# Patient Record
Sex: FEMALE | Race: WHITE | Employment: OTHER | URBAN - METROPOLITAN AREA
[De-identification: names, ages, dates, MRNs, and addresses within clinical notes are randomized per-mention and may not be internally consistent; named-entity substitution may affect disease eponyms.]

---

## 2017-01-25 DIAGNOSIS — E03.9 HYPOTHYROIDISM, UNSPECIFIED TYPE: Primary | ICD-10-CM

## 2017-01-25 PROBLEM — D12.6 BENIGN NEOPLASM OF COLON: Status: ACTIVE | Noted: 2017-01-25

## 2017-01-25 RX ORDER — CALCIUM CARBONATE 500(1250)
TABLET ORAL
COMMUNITY
Start: 2006-11-26 | End: 2020-01-01

## 2017-01-25 RX ORDER — LEVOTHYROXINE SODIUM 112 UG/1
TABLET ORAL
COMMUNITY
Start: 2016-04-19 | End: 2017-03-27

## 2017-01-25 RX ORDER — MELATONIN
COMMUNITY
Start: 2014-01-30 | End: 2017-08-07

## 2017-01-25 RX ORDER — LISINOPRIL 20 MG/1
TABLET ORAL
COMMUNITY
Start: 2015-09-03 | End: 2017-02-23

## 2017-01-25 RX ORDER — ASPIRIN 325 MG
1 TABLET ORAL DAILY
COMMUNITY
Start: 2006-11-27

## 2017-01-25 RX ORDER — ROSUVASTATIN CALCIUM 5 MG/1
TABLET, COATED ORAL
COMMUNITY
Start: 2007-06-25 | End: 2017-02-23

## 2017-01-25 RX ORDER — TOLTERODINE TARTRATE 2 MG/1
TABLET, EXTENDED RELEASE ORAL
COMMUNITY
Start: 2006-11-26 | End: 2017-02-23

## 2017-01-25 RX ORDER — OMEGA-3/DHA/EPA/FISH OIL/COQ10 1900MG/2.5
EMULSION IN PACKET ORAL
COMMUNITY
Start: 2013-09-19 | End: 2017-08-07

## 2017-01-25 RX ORDER — METOPROLOL SUCCINATE 25 MG/1
TABLET, EXTENDED RELEASE ORAL
COMMUNITY
Start: 2014-01-30 | End: 2017-02-23

## 2017-01-26 RX ORDER — LEVOTHYROXINE SODIUM 112 UG/1
TABLET ORAL
Qty: 30 TABLET | Refills: 1 | Status: SHIPPED | OUTPATIENT
Start: 2017-01-26 | End: 2017-02-23

## 2017-01-26 RX ORDER — ROSUVASTATIN CALCIUM 5 MG/1
TABLET, COATED ORAL
Qty: 90 TABLET | Refills: 0 | Status: SHIPPED | OUTPATIENT
Start: 2017-01-26 | End: 2017-02-23

## 2017-01-26 NOTE — TELEPHONE ENCOUNTER
Made appt for patient to see Dr. Jose Smith next month. Will come to appt fasting for any bloodwork needed. Please sign and send script to [facility].  Alex Estrella, 01/26/2017, 1:30 PM

## 2017-01-26 NOTE — TELEPHONE ENCOUNTER
Pt  Needs tsh now. Please have pt come in for testing. Pt is due for follow up appointment. Please have pt  Schedule apptointment . Refills until appointmentt ok.       Denise Bland MD

## 2017-02-23 ENCOUNTER — OFFICE VISIT (OUTPATIENT)
Dept: FAMILY MEDICINE CLINIC | Facility: CLINIC | Age: 82
End: 2017-02-23

## 2017-02-23 ENCOUNTER — APPOINTMENT (OUTPATIENT)
Dept: LAB | Age: 82
End: 2017-02-23
Attending: FAMILY MEDICINE
Payer: MEDICARE

## 2017-02-23 VITALS
TEMPERATURE: 99 F | DIASTOLIC BLOOD PRESSURE: 60 MMHG | HEART RATE: 63 BPM | HEIGHT: 62.5 IN | BODY MASS INDEX: 29.54 KG/M2 | WEIGHT: 164.63 LBS | OXYGEN SATURATION: 97 % | SYSTOLIC BLOOD PRESSURE: 132 MMHG

## 2017-02-23 DIAGNOSIS — E78.49 FAMILIAL MULTIPLE LIPOPROTEIN-TYPE HYPERLIPIDEMIA: ICD-10-CM

## 2017-02-23 DIAGNOSIS — N18.2 CHRONIC KIDNEY DISEASE, STAGE II (MILD): ICD-10-CM

## 2017-02-23 DIAGNOSIS — R82.998 HYPERURICURIA: ICD-10-CM

## 2017-02-23 DIAGNOSIS — E03.9 HYPOTHYROIDISM, UNSPECIFIED TYPE: ICD-10-CM

## 2017-02-23 DIAGNOSIS — I10 ESSENTIAL HYPERTENSION: ICD-10-CM

## 2017-02-23 DIAGNOSIS — E78.49 FAMILIAL MULTIPLE LIPOPROTEIN-TYPE HYPERLIPIDEMIA: Primary | ICD-10-CM

## 2017-02-23 PROBLEM — M81.0 OSTEOPOROSIS: Status: ACTIVE | Noted: 2017-02-23

## 2017-02-23 LAB
ALBUMIN SERPL-MCNC: 3.5 G/DL (ref 3.5–4.8)
ALP LIVER SERPL-CCNC: 67 U/L (ref 55–142)
ALT SERPL-CCNC: 27 U/L (ref 14–54)
AST SERPL-CCNC: 20 U/L (ref 15–41)
BILIRUB SERPL-MCNC: 0.6 MG/DL (ref 0.1–2)
BUN BLD-MCNC: 21 MG/DL (ref 8–20)
CALCIUM BLD-MCNC: 9.1 MG/DL (ref 8.3–10.3)
CHLORIDE: 107 MMOL/L (ref 101–111)
CHOLEST SMN-MCNC: 137 MG/DL (ref ?–200)
CO2: 29 MMOL/L (ref 22–32)
CREAT BLD-MCNC: 0.99 MG/DL (ref 0.55–1.02)
GLUCOSE BLD-MCNC: 88 MG/DL (ref 70–99)
HDLC SERPL-MCNC: 64 MG/DL (ref 45–?)
HDLC SERPL: 2.14 {RATIO} (ref ?–4.44)
LDLC SERPL CALC-MCNC: 55 MG/DL (ref ?–130)
M PROTEIN MFR SERPL ELPH: 7.1 G/DL (ref 6.1–8.3)
NONHDLC SERPL-MCNC: 73 MG/DL (ref ?–130)
POTASSIUM SERPL-SCNC: 4.1 MMOL/L (ref 3.6–5.1)
SODIUM SERPL-SCNC: 143 MMOL/L (ref 136–144)
TRIGLYCERIDES: 91 MG/DL (ref ?–150)
TSI SER-ACNC: 2.39 MIU/ML (ref 0.35–5.5)
URIC ACID: 5 MG/DL (ref 2.4–8)
VLDL: 18 MG/DL (ref 5–40)

## 2017-02-23 PROCEDURE — 84550 ASSAY OF BLOOD/URIC ACID: CPT

## 2017-02-23 PROCEDURE — 80061 LIPID PANEL: CPT

## 2017-02-23 PROCEDURE — 84443 ASSAY THYROID STIM HORMONE: CPT

## 2017-02-23 PROCEDURE — 99215 OFFICE O/P EST HI 40 MIN: CPT | Performed by: FAMILY MEDICINE

## 2017-02-23 PROCEDURE — 80053 COMPREHEN METABOLIC PANEL: CPT

## 2017-02-23 RX ORDER — ROSUVASTATIN CALCIUM 5 MG/1
5 TABLET, COATED ORAL NIGHTLY
Qty: 30 TABLET | Refills: 6 | Status: SHIPPED | OUTPATIENT
Start: 2017-02-23 | End: 2017-05-30

## 2017-02-23 RX ORDER — METOPROLOL SUCCINATE 25 MG/1
25 TABLET, EXTENDED RELEASE ORAL DAILY
Qty: 30 TABLET | Refills: 6 | Status: SHIPPED | OUTPATIENT
Start: 2017-02-23 | End: 2017-03-27

## 2017-02-23 RX ORDER — LISINOPRIL 20 MG/1
20 TABLET ORAL DAILY
Qty: 30 TABLET | Refills: 6 | Status: SHIPPED | OUTPATIENT
Start: 2017-02-23 | End: 2017-08-07

## 2017-02-23 RX ORDER — TOLTERODINE TARTRATE 2 MG/1
2 TABLET, EXTENDED RELEASE ORAL DAILY
Qty: 30 TABLET | Refills: 6 | Status: SHIPPED | OUTPATIENT
Start: 2017-02-23 | End: 2017-08-07

## 2017-02-23 NOTE — PROGRESS NOTES
[facility] [place]  PROGRESS NOTE        HPI:   This is a 80year old female coming in for Patient presents with: Follow - Up: Thyroid f/u    Thyroid Problem  This is a chronic problem. The current episode started more than 1 year ago.  The prob Grandmother      Allergies:    No Known Allergies        Current Meds:    Current Outpatient Prescriptions:  DiltiaZEM HCl 30 MG Oral Tab Take 1 tablet (30 mg total) by mouth 2 (two) times daily.  Disp: 60 tablet Rfl: 6   lisinopril 20 MG Oral Tab Take 1 ta Negative for orthopnea. Leg swelling on left   Gastrointestinal: Negative. Endocrine: Negative. Genitourinary: Negative. Musculoskeletal: Negative. Negative for myalgias. Skin: Negative. Neurological: Negative.   Negative for headaches nightly. Essential hypertension  -     DiltiaZEM HCl 30 MG Oral Tab; Take 1 tablet (30 mg total) by mouth 2 (two) times daily. -     lisinopril 20 MG Oral Tab;  Take 1 tablet (20 mg total) by mouth daily.  -     Metoprolol Succinate ER (TOPROL XL) 25 MG

## 2017-02-24 ENCOUNTER — TELEPHONE (OUTPATIENT)
Dept: FAMILY MEDICINE CLINIC | Facility: CLINIC | Age: 82
End: 2017-02-24

## 2017-02-24 NOTE — TELEPHONE ENCOUNTER
----- Message from Librado Beckman MD sent at 2/24/2017  8:18 AM CST -----  Labs look very good.    Continue present managment

## 2017-02-24 NOTE — TELEPHONE ENCOUNTER
Patient notified of results and recommendations and expressed understanding.     Sofía Estrella, 02/24/2017, 10:20 AM

## 2017-03-27 DIAGNOSIS — I10 ESSENTIAL HYPERTENSION: Primary | ICD-10-CM

## 2017-03-27 RX ORDER — METOPROLOL SUCCINATE 25 MG/1
25 TABLET, EXTENDED RELEASE ORAL DAILY
Qty: 90 TABLET | Refills: 1 | Status: SHIPPED | OUTPATIENT
Start: 2017-03-27 | End: 2017-06-26

## 2017-03-27 RX ORDER — LEVOTHYROXINE SODIUM 112 UG/1
112 TABLET ORAL
Qty: 90 TABLET | Refills: 1 | Status: SHIPPED | OUTPATIENT
Start: 2017-03-27 | End: 2017-10-10

## 2017-03-27 NOTE — TELEPHONE ENCOUNTER
Last OV with Dr. Benjamin April 2/23/17  Last labs including TSH 2/23/17    Beverly Estrella, 03/27/2017, 12:04 PM

## 2017-05-30 DIAGNOSIS — E78.49 FAMILIAL MULTIPLE LIPOPROTEIN-TYPE HYPERLIPIDEMIA: Primary | ICD-10-CM

## 2017-05-30 NOTE — TELEPHONE ENCOUNTER
2/23/17 last lipid panel  2/23/17 last OV with Dr. Parish Fonseca    Your appointments     Date & Time Appointment Department [facility])    Thursday August 24, 2017  9:15 AM Follow up - Extended with Ryan Pratt MD [facility] 26, [facility]

## 2017-05-31 RX ORDER — ROSUVASTATIN CALCIUM 5 MG/1
5 TABLET, COATED ORAL NIGHTLY
Qty: 30 TABLET | Refills: 3 | Status: SHIPPED | OUTPATIENT
Start: 2017-05-31 | End: 2018-01-05

## 2017-06-26 DIAGNOSIS — I10 ESSENTIAL HYPERTENSION: ICD-10-CM

## 2017-06-26 RX ORDER — METOPROLOL SUCCINATE 25 MG/1
25 TABLET, EXTENDED RELEASE ORAL DAILY
Qty: 90 TABLET | Refills: 1 | Status: SHIPPED | OUTPATIENT
Start: 2017-06-26 | End: 2017-10-10

## 2017-07-10 ENCOUNTER — TELEPHONE (OUTPATIENT)
Dept: FAMILY MEDICINE CLINIC | Facility: CLINIC | Age: 82
End: 2017-07-10

## 2017-07-10 NOTE — TELEPHONE ENCOUNTER
Patient is now at [address] in [place]  She fell this morning while she was getting dressed  Her son states he found her but it was hours later  This is FYI for Dr. Kellie Estrella, 07/10/17, 5:32 PM

## 2017-07-21 ENCOUNTER — TELEPHONE (OUTPATIENT)
Dept: FAMILY MEDICINE CLINIC | Facility: CLINIC | Age: 82
End: 2017-07-21

## 2017-07-21 NOTE — TELEPHONE ENCOUNTER
Spoke to Etienne Izaguirre at [address]  Patient currently in the hospital at [facility] [address] in [place]  Was hospitalized due to fall - see phone note 7/10/17  Etienne Izaguirre states patient is being discharged on 7/26/17  Home care has been ordered for nursing visi

## 2017-07-21 NOTE — TELEPHONE ENCOUNTER
Would like to know if Dr. Patsy Byers will follow up with her home care.   She will be discharged from hospital 7/26/17

## 2017-08-07 ENCOUNTER — TELEPHONE (OUTPATIENT)
Dept: FAMILY MEDICINE CLINIC | Facility: CLINIC | Age: 82
End: 2017-08-07

## 2017-08-07 DIAGNOSIS — I10 ESSENTIAL HYPERTENSION: ICD-10-CM

## 2017-08-07 RX ORDER — LISINOPRIL 20 MG/1
20 TABLET ORAL 2 TIMES DAILY
Qty: 30 TABLET | Refills: 6 | COMMUNITY
Start: 2017-08-07 | End: 2017-10-17

## 2017-08-07 RX ORDER — DOXEPIN HYDROCHLORIDE 50 MG/1
1 CAPSULE ORAL DAILY
COMMUNITY
End: 2020-01-01

## 2017-08-07 RX ORDER — ACETAMINOPHEN 325 MG/1
2 TABLET ORAL EVERY 6 HOURS PRN
COMMUNITY

## 2017-08-07 NOTE — TELEPHONE ENCOUNTER
Fax from home health  Patient d/c from [place] [address] list updated    Hayden Estrella, 08/07/17, 2:13 PM

## 2017-08-08 ENCOUNTER — OFFICE VISIT (OUTPATIENT)
Dept: FAMILY MEDICINE CLINIC | Facility: CLINIC | Age: 82
End: 2017-08-08

## 2017-08-08 VITALS
HEART RATE: 64 BPM | OXYGEN SATURATION: 97 % | TEMPERATURE: 99 F | DIASTOLIC BLOOD PRESSURE: 78 MMHG | HEIGHT: 62.5 IN | SYSTOLIC BLOOD PRESSURE: 114 MMHG | BODY MASS INDEX: 28.74 KG/M2 | WEIGHT: 160.19 LBS | RESPIRATION RATE: 16 BRPM

## 2017-08-08 DIAGNOSIS — N39.0 URINARY TRACT INFECTION, SITE UNSPECIFIED: Primary | ICD-10-CM

## 2017-08-08 DIAGNOSIS — I48.0 PAROXYSMAL ATRIAL FIBRILLATION (HCC): ICD-10-CM

## 2017-08-08 LAB
ALBUMIN SERPL-MCNC: 3 G/DL (ref 3.5–4.8)
ALP LIVER SERPL-CCNC: 88 U/L (ref 55–142)
ALT SERPL-CCNC: 26 U/L (ref 14–54)
AST SERPL-CCNC: 19 U/L (ref 15–41)
BASOPHILS # BLD AUTO: 0.07 X10(3) UL (ref 0–0.1)
BASOPHILS NFR BLD AUTO: 0.8 %
BILIRUB SERPL-MCNC: 0.4 MG/DL (ref 0.1–2)
BUN BLD-MCNC: 16 MG/DL (ref 8–20)
CALCIUM BLD-MCNC: 9.1 MG/DL (ref 8.3–10.3)
CHLORIDE: 108 MMOL/L (ref 101–111)
CK: 90 IU/L (ref 26–192)
CO2: 25 MMOL/L (ref 22–32)
CREAT BLD-MCNC: 0.82 MG/DL (ref 0.55–1.02)
EOSINOPHIL # BLD AUTO: 0.21 X10(3) UL (ref 0–0.3)
EOSINOPHIL NFR BLD AUTO: 2.3 %
ERYTHROCYTE [DISTWIDTH] IN BLOOD BY AUTOMATED COUNT: 13.2 % (ref 11.5–16)
GLUCOSE BLD-MCNC: 111 MG/DL (ref 70–99)
HCT VFR BLD AUTO: 43.3 % (ref 34–50)
HGB BLD-MCNC: 14.2 G/DL (ref 12–16)
IMMATURE GRANULOCYTE COUNT: 0.02 X10(3) UL (ref 0–1)
IMMATURE GRANULOCYTE RATIO %: 0.2 %
LYMPHOCYTES # BLD AUTO: 2.04 X10(3) UL (ref 0.9–4)
LYMPHOCYTES NFR BLD AUTO: 22.5 %
M PROTEIN MFR SERPL ELPH: 6.7 G/DL (ref 6.1–8.3)
MCH RBC QN AUTO: 31.6 PG (ref 27–33.2)
MCHC RBC AUTO-ENTMCNC: 32.8 G/DL (ref 31–37)
MCV RBC AUTO: 96.4 FL (ref 81–100)
MONOCYTES # BLD AUTO: 0.82 X10(3) UL (ref 0.1–0.6)
MONOCYTES NFR BLD AUTO: 9.1 %
NEUTROPHIL ABS PRELIM: 5.89 X10 (3) UL (ref 1.3–6.7)
NEUTROPHILS # BLD AUTO: 5.89 X10(3) UL (ref 1.3–6.7)
NEUTROPHILS NFR BLD AUTO: 65.1 %
PLATELET # BLD AUTO: 230 10(3)UL (ref 150–450)
POTASSIUM SERPL-SCNC: 3.9 MMOL/L (ref 3.6–5.1)
RBC # BLD AUTO: 4.49 X10(6)UL (ref 3.8–5.1)
RED CELL DISTRIBUTION WIDTH-SD: 46.8 FL (ref 35.1–46.3)
SODIUM SERPL-SCNC: 142 MMOL/L (ref 136–144)
TSI SER-ACNC: 1.69 MIU/ML (ref 0.35–5.5)
WBC # BLD AUTO: 9.1 X10(3) UL (ref 4–13)

## 2017-08-08 PROCEDURE — 99214 OFFICE O/P EST MOD 30 MIN: CPT | Performed by: FAMILY MEDICINE

## 2017-08-08 PROCEDURE — 85025 COMPLETE CBC W/AUTO DIFF WBC: CPT | Performed by: FAMILY MEDICINE

## 2017-08-08 PROCEDURE — 80053 COMPREHEN METABOLIC PANEL: CPT | Performed by: FAMILY MEDICINE

## 2017-08-08 PROCEDURE — 84443 ASSAY THYROID STIM HORMONE: CPT | Performed by: FAMILY MEDICINE

## 2017-08-08 PROCEDURE — 82550 ASSAY OF CK (CPK): CPT | Performed by: FAMILY MEDICINE

## 2017-08-08 NOTE — PROGRESS NOTES
[facility] [place]  PROGRESS NOTE        HPI:   This is a 80year old female coming in for Patient presents with:  Hospital F/U: patient fell at home, inpatient at [address] [place] [address],[address] 7-10-17 discharged to rehab 7-15-17. Was on rehab floor for 11 days. Albumin 3.5 3.5 - 4.8 g/dL   Sodium 143 136 - 144 mmol/L   Potassium 4.1 3.6 - 5.1 mmol/L   Chloride 107 101 - 111 mmol/L   CO2 29.0 22.0 - 32.0 mmol/L   -LIPID PANEL   Result Value Ref Range   Cholesterol, Total 137 <200 mg/dL   Triglycerides 91 <150 mg aspirin 325 MG Oral Tab Take 1 tablet by mouth daily. Disp:  Rfl:    Calcium 500 MG Oral Tab  Disp:  Rfl:    acetaminophen 325 MG Oral Tab Take 2 tablets by mouth every 6 (six) hours as needed.  Disp:  Rfl:       Counseling given: Yes         Problem Lis Recall the named objects: BALL 1   Recall the named objects: CAR 1   Recall the named objects: MAN 1   SHOW wristwatch. ASK: What is this called? 1   SHOW pencil. ASK: What is this called? 1   SAY: I would like you to repeat this phrase after me:  \"No CULTURE REFLEX  -     CK CREATINE KINASE (NOT CREATININE)  -     CBC W/ DIFFERENTIAL    Paroxysmal atrial fibrillation (HCC)   -     TSH W REFLEX TO FREE T4; Future  -     TSH W REFLEX TO FREE T4      delirium   Will await recheck urine.   As patient was le

## 2017-08-09 ENCOUNTER — APPOINTMENT (OUTPATIENT)
Dept: LAB | Age: 82
End: 2017-08-09
Attending: FAMILY MEDICINE
Payer: MEDICARE

## 2017-08-10 ENCOUNTER — TELEPHONE (OUTPATIENT)
Dept: FAMILY MEDICINE CLINIC | Facility: CLINIC | Age: 82
End: 2017-08-10

## 2017-08-10 RX ORDER — NITROFURANTOIN 25; 75 MG/1; MG/1
100 CAPSULE ORAL 2 TIMES DAILY
Qty: 20 CAPSULE | Refills: 0 | Status: SHIPPED | OUTPATIENT
Start: 2017-08-10 | End: 2017-08-20

## 2017-08-10 NOTE — TELEPHONE ENCOUNTER
Lorenzo Pruitt from [facility] returned call. Please call her @ [phone].

## 2017-08-10 NOTE — TELEPHONE ENCOUNTER
Patient's son Jermain Guerra notified of results and recommendations and expressed understanding.   Script to [employer] per son's request    Your appointments     Date & Time Appointment Department [facility])    Aug 24, 2017  9:15 AM CDT Follow up - Extended wi

## 2017-08-10 NOTE — TELEPHONE ENCOUNTER
----- Message from Aspen Nunez MD sent at 8/10/2017  7:07 AM CDT -----  Patient needs more protein in her diet,  But labs otherwise look good. Still awaiting urine.

## 2017-08-10 NOTE — TELEPHONE ENCOUNTER
----- Message from Ion Serrano MD sent at 8/10/2017  7:10 AM CDT -----  Still awaiting culture, however,  She has a very contaminated urine. macrobid 100 mg po bid x 10 days and then recommend pelvic exam and cath ua.  ( please make sure we have the sup

## 2017-08-11 ENCOUNTER — TELEPHONE (OUTPATIENT)
Dept: FAMILY MEDICINE CLINIC | Facility: CLINIC | Age: 82
End: 2017-08-11

## 2017-08-11 NOTE — TELEPHONE ENCOUNTER
Sensitivities included on report with final culture results  Patient was started on macrobid yesterday - see phone note  Spoke to son about this    Real Estrella, 08/11/17, 10:43 AM

## 2017-08-11 NOTE — TELEPHONE ENCOUNTER
Called to patient's son Padilla Richard  No answer and no voicemail  Will try again later    Nishant Estrella, 08/11/17, 4:36 PM

## 2017-08-11 NOTE — TELEPHONE ENCOUNTER
----- Message from Ramesh Vizcaino MD sent at 8/10/2017  8:31 PM CDT -----  Will lab do sensitives? Recommend to start medication as previously. Has patient started medications?

## 2017-08-12 RX ORDER — CIPROFLOXACIN 500 MG/1
500 TABLET, FILM COATED ORAL 2 TIMES DAILY
Qty: 14 TABLET | Refills: 0 | Status: SHIPPED | OUTPATIENT
Start: 2017-08-12 | End: 2017-08-19

## 2017-08-12 NOTE — TELEPHONE ENCOUNTER
Patient's son Dionte Adams notified of results and recommendations and expressed understanding.   Cipro script to Tang Estrella, 08/12/17, 11:32 AM

## 2017-08-24 ENCOUNTER — TELEPHONE (OUTPATIENT)
Dept: FAMILY MEDICINE CLINIC | Facility: CLINIC | Age: 82
End: 2017-08-24

## 2017-08-24 PROCEDURE — 87086 URINE CULTURE/COLONY COUNT: CPT | Performed by: FAMILY MEDICINE

## 2017-08-24 PROCEDURE — 81003 URINALYSIS AUTO W/O SCOPE: CPT | Performed by: FAMILY MEDICINE

## 2017-08-24 NOTE — TELEPHONE ENCOUNTER
----- Message from Chaitanya Reed MD sent at 8/24/2017  4:25 PM CDT -----  Very normal.    THIS IS URINALYSIS RESULTS    Chuyita Estrella, 08/25/17, 4:15 PM

## 2017-08-24 NOTE — TELEPHONE ENCOUNTER
Tried to call son Zulema Luis on his cell phone number  Rang 2-3 times then busy signal  Will try again tomorrow    Edgemont Peek Minor, 08/24/17, 5:22 PM

## 2017-08-25 NOTE — TELEPHONE ENCOUNTER
----- Message from Ion Serrano MD sent at 8/25/2017  4:00 PM CDT -----  normal results, please notify patient.     THIS IS URINE CULTURE RESULTS    Julissa Estrella, 08/25/17, 4:15 PM

## 2017-08-25 NOTE — TELEPHONE ENCOUNTER
Tried to call son Gaurang's cell phone again  Unable to get through on that number  Called to home number in chart  Patient answered the phone  Patient was confused on the phone  Unable to give me an alternate number or tell me when Vinnie Barrett would be home (deedee

## 2017-08-25 NOTE — PROGRESS NOTES
[facility] [place]  PROGRESS NOTE        HPI:   This is a 80year old female coming in for Patient presents with: Follow - Up: Follow up from previous UTI    HPI  Patient is here for a follow up of her urinary tract infection.    Her memory cont Allergies:    No Known Allergies        Current Meds:    Current Outpatient Prescriptions:  Omega-3 Fatty Acids (OMEGA-3 1450 OR) Take by mouth. Disp:  Rfl:    multivitamin Oral Tab Take 1 tablet by mouth daily.  Disp:  Rfl:    lisinopril 20 MG Oral Tab Genitourinary: Positive for difficulty urinating and urgency. Incontinence   Neurological: Positive for weakness.        EXAM:   /76 (BP Location: Left arm, Patient Position: Sitting, Cuff Size: large)   Pulse 72   Temp 98.2 °F (36.8 °C) (Tem type  -     NEURO - EXTERNAL  -     URINALYSIS, ROUTINE; Future  -     URINE CULTURE, ROUTINE; Future  -     URINALYSIS, ROUTINE  -     URINE CULTURE, ROUTINE    Urinary tract infection without hematuria, site unspecified  -     URINALYSIS, ROUTINE; Future

## 2017-08-26 NOTE — TELEPHONE ENCOUNTER
Have not been able to get son Juan Chavez on his cell phone  Unable to leave message with patient due to dementia  Letter mailed to patient's son    Julissa Sanches Minor, 08/26/17, 11:50 AM

## 2017-08-28 ENCOUNTER — TELEPHONE (OUTPATIENT)
Dept: FAMILY MEDICINE CLINIC | Facility: CLINIC | Age: 82
End: 2017-08-28

## 2017-08-28 NOTE — TELEPHONE ENCOUNTER
8/24/17 - urine was done via straight cath and sent to lab  See results notes - urine was normal  Discussed with patient's son Padilla Gascaimelda  See phone note 8/24/17 - unable to reach son  Apologized to alejandrina Richard - we had wrong cell phone number in chart    Alejandrina Richard exp

## 2017-08-31 ENCOUNTER — TELEPHONE (OUTPATIENT)
Dept: FAMILY MEDICINE CLINIC | Facility: CLINIC | Age: 82
End: 2017-08-31

## 2017-10-05 ENCOUNTER — OFFICE VISIT (OUTPATIENT)
Dept: FAMILY MEDICINE CLINIC | Facility: CLINIC | Age: 82
End: 2017-10-05

## 2017-10-05 VITALS
RESPIRATION RATE: 20 BRPM | DIASTOLIC BLOOD PRESSURE: 72 MMHG | TEMPERATURE: 99 F | HEART RATE: 64 BPM | HEIGHT: 62.5 IN | SYSTOLIC BLOOD PRESSURE: 134 MMHG | WEIGHT: 162.19 LBS | BODY MASS INDEX: 29.1 KG/M2

## 2017-10-05 DIAGNOSIS — F02.80 LATE ONSET ALZHEIMER'S DISEASE WITHOUT BEHAVIORAL DISTURBANCE (HCC): ICD-10-CM

## 2017-10-05 DIAGNOSIS — E03.9 HYPOTHYROIDISM, UNSPECIFIED TYPE: ICD-10-CM

## 2017-10-05 DIAGNOSIS — N39.0 URINARY TRACT INFECTION WITHOUT HEMATURIA, SITE UNSPECIFIED: ICD-10-CM

## 2017-10-05 DIAGNOSIS — G30.1 LATE ONSET ALZHEIMER'S DISEASE WITHOUT BEHAVIORAL DISTURBANCE (HCC): ICD-10-CM

## 2017-10-05 DIAGNOSIS — I10 ESSENTIAL HYPERTENSION: Primary | ICD-10-CM

## 2017-10-05 DIAGNOSIS — N39.41 URGE INCONTINENCE: ICD-10-CM

## 2017-10-05 DIAGNOSIS — N18.2 CHRONIC KIDNEY DISEASE, STAGE II (MILD): ICD-10-CM

## 2017-10-05 PROCEDURE — G0008 ADMIN INFLUENZA VIRUS VAC: HCPCS | Performed by: FAMILY MEDICINE

## 2017-10-05 PROCEDURE — 99214 OFFICE O/P EST MOD 30 MIN: CPT | Performed by: FAMILY MEDICINE

## 2017-10-05 PROCEDURE — 90653 IIV ADJUVANT VACCINE IM: CPT | Performed by: FAMILY MEDICINE

## 2017-10-05 NOTE — PROGRESS NOTES
[facility] [place]  PROGRESS NOTE        HPI:   This is a 80year old female coming in for Patient presents with:   Follow - Up: UTI follow up    HPI patient has overall been doing fairly well her delirium seems to be slightly better since there Son, crystal Combs  to make decisions in the event she is unable [phone]      Smoking status: Never Smoker                                                              Smokeless tobacco: Never Used                      Alcohol use: Yes           0. Nontoxic uninodular goiter     Hx of tonsillectomy     Acquired trigger finger     Urge incontinence     History of hemiarthroplasty of left hip     Osteoporosis     Late onset Alzheimer's disease without behavioral disturbance      REVIEW OF SYSTEMS:   Re time.  Hypothyroidism, unspecified type  Well-controlled continue present management. Chronic kidney disease, stage II (mild)  Plan follow-up UA.   Late onset Alzheimer's disease without behavioral disturbance  Discussed with son will fill out paperwork fo

## 2017-10-07 PROCEDURE — 87086 URINE CULTURE/COLONY COUNT: CPT | Performed by: FAMILY MEDICINE

## 2017-10-07 PROCEDURE — 81001 URINALYSIS AUTO W/SCOPE: CPT | Performed by: FAMILY MEDICINE

## 2017-10-10 DIAGNOSIS — I10 ESSENTIAL HYPERTENSION: ICD-10-CM

## 2017-10-10 RX ORDER — LEVOTHYROXINE SODIUM 112 UG/1
112 TABLET ORAL
Qty: 90 TABLET | Refills: 1 | Status: SHIPPED | OUTPATIENT
Start: 2017-10-10 | End: 2018-05-12

## 2017-10-10 RX ORDER — METOPROLOL SUCCINATE 25 MG/1
25 TABLET, EXTENDED RELEASE ORAL DAILY
Qty: 90 TABLET | Refills: 1 | Status: SHIPPED | OUTPATIENT
Start: 2017-10-10 | End: 2018-06-25

## 2017-10-10 NOTE — TELEPHONE ENCOUNTER
Future appt:     Your appointments     Date & Time Appointment Department [facility])    Jan 04, 2018  3:00 PM CST Follow up - Extended with Lali Meza MD [address] [place] [address], [facility]

## 2017-10-17 DIAGNOSIS — I10 ESSENTIAL HYPERTENSION: ICD-10-CM

## 2017-10-17 NOTE — TELEPHONE ENCOUNTER
Future appt:     Your appointments     Date & Time Appointment Department [facility])    Jan 04, 2018  3:00 PM CST Follow up - Extended with Brittany Wang MD [address] [place] [address] Gr

## 2017-10-19 DIAGNOSIS — I10 ESSENTIAL HYPERTENSION: ICD-10-CM

## 2017-10-19 RX ORDER — LISINOPRIL 20 MG/1
TABLET ORAL
Qty: 90 TABLET | Refills: 1 | Status: SHIPPED | OUTPATIENT
Start: 2017-10-19 | End: 2017-10-20

## 2017-10-20 ENCOUNTER — TELEPHONE (OUTPATIENT)
Dept: FAMILY MEDICINE CLINIC | Facility: CLINIC | Age: 82
End: 2017-10-20

## 2017-10-20 DIAGNOSIS — I10 ESSENTIAL HYPERTENSION: ICD-10-CM

## 2017-10-20 RX ORDER — LISINOPRIL 20 MG/1
TABLET ORAL
Qty: 90 TABLET | Refills: 1 | OUTPATIENT
Start: 2017-10-20

## 2017-10-20 RX ORDER — LISINOPRIL 20 MG/1
20 TABLET ORAL 2 TIMES DAILY
Qty: 180 TABLET | Refills: 1 | Status: SHIPPED | OUTPATIENT
Start: 2017-10-20 | End: 2018-04-09

## 2017-10-20 NOTE — TELEPHONE ENCOUNTER
Patient's son states that when patient was hospitalized her lisinopril was changed to twice a day instead of once a day  Needs new script sent to [facility]   Patient out of gm Estrella, 10/20/17, 2:15 PM

## 2018-01-04 NOTE — PROGRESS NOTES
[address]  PROGRESS NOTE  Charisma Ribeiro is a 80year old female. Chief Complaint:  Patient presents with: Follow - Up  Swelling: cannot get wedding ring off finger    HPI:   Patient presents to office visit for follow-up.       Lyndon FLYNN mouth before breakfast. Disp: 90 tablet Rfl: 1   Metoprolol Succinate ER (TOPROL XL) 25 MG Oral Tablet 24 Hr Take 1 tablet (25 mg total) by mouth daily. Disp: 90 tablet Rfl: 1   Omega-3 Fatty Acids (OMEGA-3 1450 OR) Take by mouth.  Disp:  Rfl:    acetaminop diagnosis)  Hypothyroidism, unspecified type  Finger swelling      PLAN: Meds as below.   Comfort care instructions as listed in Patient Instructions    Meds & Refills for this Visit:    Signed Prescriptions Disp Refills    Rosuvastatin Calcium (CRESTOR) 5

## 2018-01-05 ENCOUNTER — OFFICE VISIT (OUTPATIENT)
Dept: FAMILY MEDICINE CLINIC | Facility: CLINIC | Age: 83
End: 2018-01-05

## 2018-01-05 VITALS
HEIGHT: 62.5 IN | TEMPERATURE: 99 F | HEART RATE: 72 BPM | BODY MASS INDEX: 29.93 KG/M2 | RESPIRATION RATE: 20 BRPM | SYSTOLIC BLOOD PRESSURE: 132 MMHG | WEIGHT: 166.81 LBS | DIASTOLIC BLOOD PRESSURE: 80 MMHG

## 2018-01-05 DIAGNOSIS — I10 ESSENTIAL HYPERTENSION: Primary | ICD-10-CM

## 2018-01-05 DIAGNOSIS — N18.2 CHRONIC KIDNEY DISEASE, STAGE II (MILD): ICD-10-CM

## 2018-01-05 DIAGNOSIS — M79.89 FINGER SWELLING: ICD-10-CM

## 2018-01-05 DIAGNOSIS — E78.49 FAMILIAL MULTIPLE LIPOPROTEIN-TYPE HYPERLIPIDEMIA: ICD-10-CM

## 2018-01-05 DIAGNOSIS — E03.9 HYPOTHYROIDISM, UNSPECIFIED TYPE: ICD-10-CM

## 2018-01-05 PROBLEM — M19.90 ARTHRITIS: Status: ACTIVE | Noted: 2018-01-05

## 2018-01-05 PROBLEM — E78.5 HYPERLIPIDEMIA: Status: ACTIVE | Noted: 2018-01-05

## 2018-01-05 PROBLEM — I48.91 ATRIAL FIBRILLATION (HCC): Status: ACTIVE | Noted: 2018-01-05

## 2018-01-05 LAB
ALBUMIN SERPL-MCNC: 3.3 G/DL (ref 3.5–4.8)
ALP LIVER SERPL-CCNC: 73 U/L (ref 55–142)
ALT SERPL-CCNC: 28 U/L (ref 14–54)
AST SERPL-CCNC: 26 U/L (ref 15–41)
BASOPHILS # BLD AUTO: 0.06 X10(3) UL (ref 0–0.1)
BASOPHILS NFR BLD AUTO: 0.7 %
BILIRUB SERPL-MCNC: 0.3 MG/DL (ref 0.1–2)
BUN BLD-MCNC: 25 MG/DL (ref 8–20)
CALCIUM BLD-MCNC: 9.6 MG/DL (ref 8.3–10.3)
CHLORIDE: 107 MMOL/L (ref 101–111)
CO2: 27 MMOL/L (ref 22–32)
CREAT BLD-MCNC: 0.88 MG/DL (ref 0.55–1.02)
EOSINOPHIL # BLD AUTO: 0.15 X10(3) UL (ref 0–0.3)
EOSINOPHIL NFR BLD AUTO: 1.7 %
ERYTHROCYTE [DISTWIDTH] IN BLOOD BY AUTOMATED COUNT: 12.9 % (ref 11.5–16)
GLUCOSE BLD-MCNC: 94 MG/DL (ref 70–99)
HCT VFR BLD AUTO: 49.3 % (ref 34–50)
HGB BLD-MCNC: 16.1 G/DL (ref 12–16)
IMMATURE GRANULOCYTE COUNT: 0.01 X10(3) UL (ref 0–1)
IMMATURE GRANULOCYTE RATIO %: 0.1 %
LYMPHOCYTES # BLD AUTO: 2.6 X10(3) UL (ref 0.9–4)
LYMPHOCYTES NFR BLD AUTO: 29.1 %
M PROTEIN MFR SERPL ELPH: 7.2 G/DL (ref 6.1–8.3)
MCH RBC QN AUTO: 31.3 PG (ref 27–33.2)
MCHC RBC AUTO-ENTMCNC: 32.7 G/DL (ref 31–37)
MCV RBC AUTO: 95.9 FL (ref 81–100)
MONOCYTES # BLD AUTO: 0.76 X10(3) UL (ref 0.1–0.6)
MONOCYTES NFR BLD AUTO: 8.5 %
NEUTROPHIL ABS PRELIM: 5.36 X10 (3) UL (ref 1.3–6.7)
NEUTROPHILS # BLD AUTO: 5.36 X10(3) UL (ref 1.3–6.7)
NEUTROPHILS NFR BLD AUTO: 59.9 %
PLATELET # BLD AUTO: 267 10(3)UL (ref 150–450)
POTASSIUM SERPL-SCNC: 4.5 MMOL/L (ref 3.6–5.1)
RBC # BLD AUTO: 5.14 X10(6)UL (ref 3.8–5.1)
RED CELL DISTRIBUTION WIDTH-SD: 45.8 FL (ref 35.1–46.3)
SODIUM SERPL-SCNC: 141 MMOL/L (ref 136–144)
WBC # BLD AUTO: 8.9 X10(3) UL (ref 4–13)

## 2018-01-05 PROCEDURE — 85025 COMPLETE CBC W/AUTO DIFF WBC: CPT | Performed by: NURSE PRACTITIONER

## 2018-01-05 PROCEDURE — 80053 COMPREHEN METABOLIC PANEL: CPT | Performed by: NURSE PRACTITIONER

## 2018-01-05 PROCEDURE — 99214 OFFICE O/P EST MOD 30 MIN: CPT | Performed by: NURSE PRACTITIONER

## 2018-01-05 RX ORDER — ROSUVASTATIN CALCIUM 5 MG/1
5 TABLET, COATED ORAL NIGHTLY
Qty: 30 TABLET | Refills: 3 | Status: SHIPPED | OUTPATIENT
Start: 2018-01-05 | End: 2018-05-12

## 2018-01-05 NOTE — PATIENT INSTRUCTIONS
Blood work today  Refilled crestor  Recommend ice water and/or soapy water to help get ring off. If unable to ring off or have increase pain or inability to move/bend finger or finger turns blue--go to ER.   Return in 3 months for physical with Dr. Jake Mendoza and

## 2018-01-09 ENCOUNTER — TELEPHONE (OUTPATIENT)
Dept: FAMILY MEDICINE CLINIC | Facility: CLINIC | Age: 83
End: 2018-01-09

## 2018-04-09 DIAGNOSIS — I10 ESSENTIAL HYPERTENSION: ICD-10-CM

## 2018-04-10 RX ORDER — LISINOPRIL 20 MG/1
TABLET ORAL
Qty: 180 TABLET | Refills: 0 | Status: SHIPPED | OUTPATIENT
Start: 2018-04-10 | End: 2018-06-25

## 2018-04-10 RX ORDER — METOPROLOL SUCCINATE 25 MG/1
TABLET, EXTENDED RELEASE ORAL
Qty: 90 TABLET | Refills: 0 | Status: SHIPPED | OUTPATIENT
Start: 2018-04-10 | End: 2018-12-18 | Stop reason: ALTCHOICE

## 2018-04-10 NOTE — TELEPHONE ENCOUNTER
Future appt:     Your appointments     Date & Time Appointment Department [facility])    Jun 25, 2018  2:20 PM CDT Medicare Annual Well Visit with Isabell Orona MD [facility], [place] ([place]

## 2018-05-12 DIAGNOSIS — E78.49 FAMILIAL MULTIPLE LIPOPROTEIN-TYPE HYPERLIPIDEMIA: ICD-10-CM

## 2018-05-12 NOTE — TELEPHONE ENCOUNTER
Future appt:     Your appointments     Date & Time Appointment Department [facility])    Jun 25, 2018  2:20 PM CDT Medicare Annual Well Visit with Ryan Pratt MD [address] [place] [address], [place] ([facility])        Charo Todd

## 2018-05-14 RX ORDER — ROSUVASTATIN CALCIUM 5 MG/1
5 TABLET, COATED ORAL NIGHTLY
Qty: 90 TABLET | Refills: 0 | Status: SHIPPED | OUTPATIENT
Start: 2018-05-14 | End: 2018-06-25

## 2018-05-14 RX ORDER — LEVOTHYROXINE SODIUM 112 UG/1
TABLET ORAL
Qty: 90 TABLET | Refills: 1 | OUTPATIENT
Start: 2018-05-14

## 2018-05-14 RX ORDER — LEVOTHYROXINE SODIUM 112 UG/1
112 TABLET ORAL
Qty: 90 TABLET | Refills: 0 | Status: SHIPPED | OUTPATIENT
Start: 2018-05-14 | End: 2018-06-25

## 2018-05-14 NOTE — TELEPHONE ENCOUNTER
Dr. Cassidy Mandujano is out of the office today. Refills done.  DARREL Nichole, FNP-BC  5/14/2018  11:11 AM

## 2018-05-14 NOTE — TELEPHONE ENCOUNTER
Future appt:     Your appointments     Date & Time Appointment Department [facility])    Jun 25, 2018  2:20 PM CDT Medicare Annual Well Visit with Beckie Ortega MD [address] [place] [address], [place] ([facility]        Danny Ying

## 2018-06-25 ENCOUNTER — OFFICE VISIT (OUTPATIENT)
Dept: FAMILY MEDICINE CLINIC | Facility: CLINIC | Age: 83
End: 2018-06-25

## 2018-06-25 VITALS
RESPIRATION RATE: 18 BRPM | HEIGHT: 62.5 IN | SYSTOLIC BLOOD PRESSURE: 122 MMHG | DIASTOLIC BLOOD PRESSURE: 80 MMHG | WEIGHT: 174.63 LBS | TEMPERATURE: 97 F | BODY MASS INDEX: 31.33 KG/M2 | HEART RATE: 72 BPM

## 2018-06-25 DIAGNOSIS — E03.9 HYPOTHYROIDISM, UNSPECIFIED TYPE: ICD-10-CM

## 2018-06-25 DIAGNOSIS — M81.0 AGE-RELATED OSTEOPOROSIS WITHOUT CURRENT PATHOLOGICAL FRACTURE: ICD-10-CM

## 2018-06-25 DIAGNOSIS — Z13.31 DEPRESSION SCREENING: ICD-10-CM

## 2018-06-25 DIAGNOSIS — E78.49 FAMILIAL MULTIPLE LIPOPROTEIN-TYPE HYPERLIPIDEMIA: ICD-10-CM

## 2018-06-25 DIAGNOSIS — N18.2 CHRONIC KIDNEY DISEASE, STAGE II (MILD): ICD-10-CM

## 2018-06-25 DIAGNOSIS — Z00.00 ENCOUNTER FOR ANNUAL HEALTH EXAMINATION: ICD-10-CM

## 2018-06-25 DIAGNOSIS — R82.998 HYPERURICURIA: ICD-10-CM

## 2018-06-25 DIAGNOSIS — E78.01 FAMILIAL HYPERCHOLESTEROLEMIA: ICD-10-CM

## 2018-06-25 DIAGNOSIS — I48.20 CHRONIC ATRIAL FIBRILLATION (HCC): Primary | ICD-10-CM

## 2018-06-25 DIAGNOSIS — I10 ESSENTIAL HYPERTENSION: ICD-10-CM

## 2018-06-25 DIAGNOSIS — E83.51 HYPOCALCEMIA: ICD-10-CM

## 2018-06-25 PROCEDURE — G0442 ANNUAL ALCOHOL SCREEN 15 MIN: HCPCS | Performed by: FAMILY MEDICINE

## 2018-06-25 PROCEDURE — G0439 PPPS, SUBSEQ VISIT: HCPCS | Performed by: FAMILY MEDICINE

## 2018-06-25 PROCEDURE — G0444 DEPRESSION SCREEN ANNUAL: HCPCS | Performed by: FAMILY MEDICINE

## 2018-06-25 PROCEDURE — 90715 TDAP VACCINE 7 YRS/> IM: CPT | Performed by: FAMILY MEDICINE

## 2018-06-25 PROCEDURE — 90471 IMMUNIZATION ADMIN: CPT | Performed by: FAMILY MEDICINE

## 2018-06-25 PROCEDURE — 99497 ADVNCD CARE PLAN 30 MIN: CPT | Performed by: FAMILY MEDICINE

## 2018-06-25 RX ORDER — METOPROLOL SUCCINATE 25 MG/1
25 TABLET, EXTENDED RELEASE ORAL DAILY
Qty: 90 TABLET | Refills: 1 | Status: SHIPPED | OUTPATIENT
Start: 2018-06-25 | End: 2019-01-15

## 2018-06-25 RX ORDER — ROSUVASTATIN CALCIUM 5 MG/1
5 TABLET, COATED ORAL NIGHTLY
Qty: 90 TABLET | Refills: 0 | Status: SHIPPED | OUTPATIENT
Start: 2018-06-25 | End: 2019-07-11

## 2018-06-25 RX ORDER — LISINOPRIL 20 MG/1
20 TABLET ORAL 2 TIMES DAILY
Qty: 180 TABLET | Refills: 1 | Status: SHIPPED | OUTPATIENT
Start: 2018-06-25 | End: 2018-10-15

## 2018-06-25 RX ORDER — LEVOTHYROXINE SODIUM 112 UG/1
112 TABLET ORAL
Qty: 90 TABLET | Refills: 0 | Status: SHIPPED | OUTPATIENT
Start: 2018-06-25 | End: 2018-07-06

## 2018-06-25 NOTE — PROGRESS NOTES
HPI:   Eleanor Carbajal is a 80year old female who presents for a Medicare Subsequent Annual Wellness visit (Pt already had Initial Annual Wellness). Patient has a spot on her back noted by her son.    Noticed that she has  A few brown spots on her ba help    She has Toileting difficulties based on screening of functional status. Toileting: Cannot do without help    She has Driving difficulties based on screening of functional status.    Driving: Cannot do without help   She has Meal Preparation diffic low risk.     Patient Care Team: Patient Care Team:  Mica Winchester MD as PCP - [facility])    Patient Active Problem List:     Hyperuricuria     Pain in thoracic spine     Cataract     Chronic kidney disease, stage II (mild)     Benign neopla Levothyroxine Sodium (SYNTHROID) 112 MCG Oral Tab Take 1 tablet (112 mcg total) by mouth before breakfast.   METOPROLOL SUCCINATE ER 25 MG Oral Tablet 24 Hr TAKE 1 TABLET BY MOUTH DAILY.    Omega-3 Fatty Acids (OMEGA-3 1450 OR) Take by mouth.   multivitam encounter: 174 lb 9.6 oz.     Medicare Hearing Assessment  (Required for AWV/SWV)    Hearing Screening    Time taken:  6/25/2018  2:22 PM  Screening Method:  Finger Rub  Finger Rub Result:  Pass             Visual Acuity                           Physical E nightly. Discussed stopping medications. No inclination at this time. Discussed paucity of literature.      Other orders  -     TETANUS, DIPHTHERIA TOXOIDS AND ACELLULAR PERTUSIS VACCINE (TDAP), >7 YEARS, IM USE  -     Levothyroxine Sodium (Genemarjan Smith any previous visit. No flowsheet data found. Fecal Occult Blood Annually No results found for: FOB No flowsheet data found. Glaucoma Screening      Ophthalmology Visit Annually: Diabetics, FHx Glaucoma, AA>50, > 65 No flowsheet data found. SPECIFIC DISEASE MONITORING Internal Lab or Procedure External Lab or Procedure      Annual Monitoring of Persistent     Medications (ACE/ARB, digoxin diuretics, anticonvulsants.)    Potassium  Annually Potassium (mmol/L)   Date Value   01/05/2018 4.5

## 2018-06-25 NOTE — PATIENT INSTRUCTIONS
Estefani Sung's SCREENING SCHEDULE   Tests on this list are recommended by your physician but may not be covered, or covered at this frequency, by your insurer. Please check with your insurance carrier before scheduling to verify coverage.    PREVENTATIV if abnormal There are no preventive care reminders to display for this patient. Update Health Maintenance if applicable    Flex Sigmoidoscopy Screen  Covered every 5 years No results found for this or any previous visit. No flowsheet data found.      Fecal birthday    Pneumococcal 23 (Pneumovax)  Covered Once after 65 No orders found for this or any previous visit. Please get once after your 65th birthday    Hepatitis B for Moderate/High Risk       No orders found for this or any previous visit.  Medium/high

## 2018-07-03 ENCOUNTER — LABORATORY ENCOUNTER (OUTPATIENT)
Dept: LAB | Age: 83
End: 2018-07-03
Attending: FAMILY MEDICINE
Payer: MEDICARE

## 2018-07-03 DIAGNOSIS — I10 ESSENTIAL HYPERTENSION: ICD-10-CM

## 2018-07-03 DIAGNOSIS — E03.9 HYPOTHYROIDISM, UNSPECIFIED TYPE: ICD-10-CM

## 2018-07-03 DIAGNOSIS — N18.2 CHRONIC KIDNEY DISEASE, STAGE II (MILD): ICD-10-CM

## 2018-07-03 DIAGNOSIS — E78.01 FAMILIAL HYPERCHOLESTEROLEMIA: ICD-10-CM

## 2018-07-03 DIAGNOSIS — I48.20 CHRONIC ATRIAL FIBRILLATION (HCC): ICD-10-CM

## 2018-07-03 LAB
ALBUMIN SERPL-MCNC: 3.2 G/DL (ref 3.5–4.8)
ALP LIVER SERPL-CCNC: 74 U/L (ref 55–142)
ALT SERPL-CCNC: 27 U/L (ref 14–54)
AST SERPL-CCNC: 21 U/L (ref 15–41)
BASOPHILS # BLD AUTO: 0.05 X10(3) UL (ref 0–0.1)
BASOPHILS NFR BLD AUTO: 0.5 %
BILIRUB SERPL-MCNC: 0.7 MG/DL (ref 0.1–2)
BUN BLD-MCNC: 15 MG/DL (ref 8–20)
CALCIUM BLD-MCNC: 9 MG/DL (ref 8.3–10.3)
CHLORIDE: 105 MMOL/L (ref 101–111)
CHOLEST SMN-MCNC: 142 MG/DL (ref ?–200)
CK: 62 IU/L (ref 26–192)
CO2: 26 MMOL/L (ref 22–32)
CREAT BLD-MCNC: 0.97 MG/DL (ref 0.55–1.02)
EOSINOPHIL # BLD AUTO: 0.14 X10(3) UL (ref 0–0.3)
EOSINOPHIL NFR BLD AUTO: 1.5 %
ERYTHROCYTE [DISTWIDTH] IN BLOOD BY AUTOMATED COUNT: 13 % (ref 11.5–16)
FREE T4: 1.4 NG/DL (ref 0.9–1.8)
GLUCOSE BLD-MCNC: 89 MG/DL (ref 70–99)
HCT VFR BLD AUTO: 50.2 % (ref 34–50)
HDLC SERPL-MCNC: 60 MG/DL (ref 45–?)
HDLC SERPL: 2.37 {RATIO} (ref ?–4.44)
HGB BLD-MCNC: 16 G/DL (ref 12–16)
IMMATURE GRANULOCYTE COUNT: 0.03 X10(3) UL (ref 0–1)
IMMATURE GRANULOCYTE RATIO %: 0.3 %
LDLC SERPL CALC-MCNC: 63 MG/DL (ref ?–130)
LYMPHOCYTES # BLD AUTO: 2.89 X10(3) UL (ref 0.9–4)
LYMPHOCYTES NFR BLD AUTO: 31.2 %
M PROTEIN MFR SERPL ELPH: 7.2 G/DL (ref 6.1–8.3)
MCH RBC QN AUTO: 31.6 PG (ref 27–33.2)
MCHC RBC AUTO-ENTMCNC: 31.9 G/DL (ref 31–37)
MCV RBC AUTO: 99 FL (ref 81–100)
MONOCYTES # BLD AUTO: 0.82 X10(3) UL (ref 0.1–1)
MONOCYTES NFR BLD AUTO: 8.8 %
NEUTROPHIL ABS PRELIM: 5.34 X10 (3) UL (ref 1.3–6.7)
NEUTROPHILS # BLD AUTO: 5.34 X10(3) UL (ref 1.3–6.7)
NEUTROPHILS NFR BLD AUTO: 57.7 %
NONHDLC SERPL-MCNC: 82 MG/DL (ref ?–130)
PLATELET # BLD AUTO: 256 10(3)UL (ref 150–450)
POTASSIUM SERPL-SCNC: 4 MMOL/L (ref 3.6–5.1)
RBC # BLD AUTO: 5.07 X10(6)UL (ref 3.8–5.1)
RED CELL DISTRIBUTION WIDTH-SD: 47.3 FL (ref 35.1–46.3)
SODIUM SERPL-SCNC: 141 MMOL/L (ref 136–144)
TRIGL SERPL-MCNC: 96 MG/DL (ref ?–150)
TSI SER-ACNC: 8.44 MIU/ML (ref 0.35–5.5)
URIC ACID: 5.2 MG/DL (ref 2.4–8)
VLDLC SERPL CALC-MCNC: 19 MG/DL (ref 5–40)
WBC # BLD AUTO: 9.3 X10(3) UL (ref 4–13)

## 2018-07-03 PROCEDURE — 84439 ASSAY OF FREE THYROXINE: CPT

## 2018-07-03 PROCEDURE — 80053 COMPREHEN METABOLIC PANEL: CPT

## 2018-07-03 PROCEDURE — 36415 COLL VENOUS BLD VENIPUNCTURE: CPT

## 2018-07-03 PROCEDURE — 82550 ASSAY OF CK (CPK): CPT

## 2018-07-03 PROCEDURE — 84550 ASSAY OF BLOOD/URIC ACID: CPT

## 2018-07-03 PROCEDURE — 84443 ASSAY THYROID STIM HORMONE: CPT

## 2018-07-03 PROCEDURE — 80061 LIPID PANEL: CPT

## 2018-07-03 PROCEDURE — 85025 COMPLETE CBC W/AUTO DIFF WBC: CPT

## 2018-07-06 ENCOUNTER — TELEPHONE (OUTPATIENT)
Dept: FAMILY MEDICINE CLINIC | Facility: CLINIC | Age: 83
End: 2018-07-06

## 2018-07-06 DIAGNOSIS — R79.89 ELEVATED TSH: ICD-10-CM

## 2018-07-06 DIAGNOSIS — E03.9 HYPOTHYROIDISM, UNSPECIFIED TYPE: Primary | ICD-10-CM

## 2018-07-06 RX ORDER — LEVOTHYROXINE SODIUM 0.12 MG/1
125 TABLET ORAL
Qty: 30 TABLET | Refills: 2 | Status: SHIPPED | OUTPATIENT
Start: 2018-07-06 | End: 2018-09-01

## 2018-07-06 NOTE — TELEPHONE ENCOUNTER
Patient's son Scarlet Mcadams notified of results and recommendations and expressed understanding.   Script to [employer] per [employer] request  Lab appt made for recheck TSH  Order into chart    [place] Patient Minor, 07/06/18, 10:45 AM

## 2018-07-06 NOTE — TELEPHONE ENCOUNTER
----- Message from Renetta Mcdonald MD sent at 7/3/2018  5:25 PM CDT -----  Increase synthroid to 125 mcg. Recheck TSH in 8 weeks. Update thyroid flow sheet. Otherwise labs are good.

## 2018-07-07 ENCOUNTER — APPOINTMENT (OUTPATIENT)
Dept: LAB | Age: 83
End: 2018-07-07
Attending: FAMILY MEDICINE
Payer: MEDICARE

## 2018-07-07 LAB
BILIRUB UR QL STRIP.AUTO: NEGATIVE
COLOR UR AUTO: YELLOW
GLUCOSE UR STRIP.AUTO-MCNC: NEGATIVE MG/DL
KETONES UR STRIP.AUTO-MCNC: NEGATIVE MG/DL
NITRITE UR QL STRIP.AUTO: NEGATIVE
PH UR STRIP.AUTO: 7 [PH] (ref 4.5–8)
PROT UR STRIP.AUTO-MCNC: NEGATIVE MG/DL
RBC UR QL AUTO: NEGATIVE
SP GR UR STRIP.AUTO: 1.01 (ref 1–1.03)
UROBILINOGEN UR STRIP.AUTO-MCNC: <2 MG/DL

## 2018-07-07 PROCEDURE — 87086 URINE CULTURE/COLONY COUNT: CPT

## 2018-07-07 PROCEDURE — 81001 URINALYSIS AUTO W/SCOPE: CPT

## 2018-07-30 ENCOUNTER — TELEPHONE (OUTPATIENT)
Dept: FAMILY MEDICINE CLINIC | Facility: CLINIC | Age: 83
End: 2018-07-30

## 2018-07-30 NOTE — TELEPHONE ENCOUNTER
Spoke with Luis Ac at [address] at [facility]  She wants Dr. Parish Fonseca to know that she did a \"start of care\" on patient yesterday  They will be formulating her plan of care  States will fax orders for Dr. Parish Fonseca to sign  Nothing needed at this time - this is FYI for D

## 2018-07-31 NOTE — TELEPHONE ENCOUNTER
Verbal approval given for PT per Dr. Jen Adler  Given to Alan Carrasco at [facility], 07/31/18, 3:05 PM

## 2018-08-06 ENCOUNTER — TELEPHONE (OUTPATIENT)
Dept: FAMILY MEDICINE CLINIC | Facility: CLINIC | Age: 83
End: 2018-08-06

## 2018-08-08 NOTE — TELEPHONE ENCOUNTER
Spoke with Juliette Rhodes and she states that pt needs work on orientation for safety precautions.  She needs visual cues to help her remember the safety precautions that she should use with her wheelchair, etc.     She is going to fax over the eval and order req

## 2018-08-09 DIAGNOSIS — E78.49 FAMILIAL MULTIPLE LIPOPROTEIN-TYPE HYPERLIPIDEMIA: ICD-10-CM

## 2018-08-09 RX ORDER — LEVOTHYROXINE SODIUM 112 UG/1
TABLET ORAL
Qty: 90 TABLET | Refills: 0 | OUTPATIENT
Start: 2018-08-09

## 2018-08-09 RX ORDER — ROSUVASTATIN CALCIUM 5 MG/1
TABLET, COATED ORAL
Qty: 90 TABLET | Refills: 1 | Status: SHIPPED | OUTPATIENT
Start: 2018-08-09 | End: 2018-12-18

## 2018-08-09 NOTE — TELEPHONE ENCOUNTER
Please advise refill of Rosuvastatin 5mg. Last Rx: 6/25/18,     Future appt:     Your appointments     Date & Time Appointment Department [facility])    Sep 10, 2018  3:45 PM CDT Laboratory Visit with REF [facility] ([facility])

## 2018-09-01 NOTE — TELEPHONE ENCOUNTER
Future appt:     Your appointments     Date & Time Appointment Department [facility])    Sep 10, 2018  3:45 PM CDT Laboratory Visit with REF [facility] ([facility] [place])    Dec 18, 2018  4:00 PM CST Follow up with MD Maritza Coe

## 2018-09-04 RX ORDER — LEVOTHYROXINE SODIUM 0.12 MG/1
TABLET ORAL
Qty: 30 TABLET | Refills: 5 | Status: SHIPPED | OUTPATIENT
Start: 2018-09-04 | End: 2019-04-02

## 2018-09-10 ENCOUNTER — APPOINTMENT (OUTPATIENT)
Dept: LAB | Age: 83
End: 2018-09-10
Attending: FAMILY MEDICINE
Payer: MEDICARE

## 2018-09-10 DIAGNOSIS — R79.89 ELEVATED TSH: ICD-10-CM

## 2018-09-10 DIAGNOSIS — E03.9 HYPOTHYROIDISM, UNSPECIFIED TYPE: ICD-10-CM

## 2018-09-10 LAB — TSI SER-ACNC: 1.53 MIU/ML (ref 0.35–5.5)

## 2018-09-10 PROCEDURE — 84443 ASSAY THYROID STIM HORMONE: CPT

## 2018-09-10 PROCEDURE — 36415 COLL VENOUS BLD VENIPUNCTURE: CPT

## 2018-09-11 ENCOUNTER — TELEPHONE (OUTPATIENT)
Dept: FAMILY MEDICINE CLINIC | Facility: CLINIC | Age: 83
End: 2018-09-11

## 2018-09-11 NOTE — TELEPHONE ENCOUNTER
----- Message from MESSI Lopez sent at 9/11/2018 12:39 PM CDT -----  Dr. Ary Gallego is out of the office today. Please let patient know that her thyroid level is now normal.  Continue current medications.

## 2018-09-18 ENCOUNTER — TELEPHONE (OUTPATIENT)
Dept: FAMILY MEDICINE CLINIC | Facility: CLINIC | Age: 83
End: 2018-09-18

## 2018-09-18 NOTE — TELEPHONE ENCOUNTER
Nurse wants to give update on patient, patient is doing well and nurse will be re-certifying just for more education

## 2018-09-24 ENCOUNTER — TELEPHONE (OUTPATIENT)
Dept: FAMILY MEDICINE CLINIC | Facility: CLINIC | Age: 83
End: 2018-09-24

## 2018-09-24 NOTE — TELEPHONE ENCOUNTER
Spoke to speech pathologist Areli Weems  She states she has been working with patient  States patient is making progress  States for 6-7 weeks patient reported the year as 1981 but today reported it as 2018  States she has improvements with word retrieval, f

## 2018-09-24 NOTE — TELEPHONE ENCOUNTER
Verbal order given to Enoch Barajas to continue therapy as directed by Dr. Memory Kocher, 09/24/18, 6:52 PM

## 2018-10-01 NOTE — TELEPHONE ENCOUNTER
Last appt was 6/25/18 for a physical with Dr. Ary Gallego  Instructions to return in 6months. Pt has appt scheduled    Last refill 9/4/18    appt:     Your appointments     Date & Time Appointment Department [facility])    Dec 18, 2018  4:00 PM CST Follow up with [facility]

## 2018-10-01 NOTE — TELEPHONE ENCOUNTER
Future appt:     Your appointments     Date & Time Appointment Department [facility])    Dec 18, 2018  4:00 PM CST Follow up with Kaela Valdez MD [facility] ([facility])        [address]

## 2018-10-02 RX ORDER — LEVOTHYROXINE SODIUM 0.12 MG/1
TABLET ORAL
Qty: 30 TABLET | Refills: 2 | OUTPATIENT
Start: 2018-10-02

## 2018-10-02 NOTE — TELEPHONE ENCOUNTER
Spoke with [facility]. Prescription ready for patient at pharmacy from previous request. Called patient to let her know refill is ready for .

## 2018-10-15 DIAGNOSIS — I10 ESSENTIAL HYPERTENSION: ICD-10-CM

## 2018-10-15 RX ORDER — LISINOPRIL 20 MG/1
TABLET ORAL
Qty: 180 TABLET | Refills: 1 | Status: SHIPPED | OUTPATIENT
Start: 2018-10-15 | End: 2019-11-05

## 2018-10-15 RX ORDER — LISINOPRIL 20 MG/1
20 TABLET ORAL 2 TIMES DAILY
Qty: 180 TABLET | Refills: 1 | Status: SHIPPED | OUTPATIENT
Start: 2018-10-15 | End: 2018-12-18 | Stop reason: ALTCHOICE

## 2018-10-15 RX ORDER — METOPROLOL SUCCINATE 25 MG/1
TABLET, EXTENDED RELEASE ORAL
Qty: 90 TABLET | Refills: 1 | Status: SHIPPED | OUTPATIENT
Start: 2018-10-15 | End: 2018-12-18

## 2018-10-15 NOTE — TELEPHONE ENCOUNTER
Future appt:     Your appointments     Date & Time Appointment Department [facility])    Dec 18, 2018  4:00 PM CST Follow up with Jacque Martínez MD [facility])        [facility]

## 2018-10-16 NOTE — TELEPHONE ENCOUNTER
Last appt 6/25/18 for px  Instructions to return in 6months    Last refill  Lisinopril today  Metoprolol 6/25/18    Future appt:     Your appointments     Date & Time Appointment Department [facility])    Dec 18, 2018  4:00 PM CST Follow up with Chantale Hi,

## 2018-10-29 ENCOUNTER — TELEPHONE (OUTPATIENT)
Dept: FAMILY MEDICINE CLINIC | Facility: CLINIC | Age: 83
End: 2018-10-29

## 2018-10-29 NOTE — TELEPHONE ENCOUNTER
Lois Daley see her on sunday then discharge her, nursing wise she is doing fine, speech and therapy for a few more weeks still

## 2018-10-29 NOTE — TELEPHONE ENCOUNTER
Spoke with Aravind Faust from [facility] and she states that from a nursing aspect pt is doing well. She does need a couple more weeks of therapy though.

## 2018-12-18 ENCOUNTER — OFFICE VISIT (OUTPATIENT)
Dept: FAMILY MEDICINE CLINIC | Facility: CLINIC | Age: 83
End: 2018-12-18
Payer: MEDICARE

## 2018-12-18 VITALS
TEMPERATURE: 98 F | BODY MASS INDEX: 31 KG/M2 | SYSTOLIC BLOOD PRESSURE: 130 MMHG | RESPIRATION RATE: 26 BRPM | WEIGHT: 170.63 LBS | DIASTOLIC BLOOD PRESSURE: 70 MMHG | HEART RATE: 80 BPM

## 2018-12-18 DIAGNOSIS — I10 ESSENTIAL HYPERTENSION: ICD-10-CM

## 2018-12-18 DIAGNOSIS — E78.49 FAMILIAL MULTIPLE LIPOPROTEIN-TYPE HYPERLIPIDEMIA: Primary | ICD-10-CM

## 2018-12-18 DIAGNOSIS — I48.20 CHRONIC ATRIAL FIBRILLATION (HCC): ICD-10-CM

## 2018-12-18 DIAGNOSIS — E03.9 HYPOTHYROIDISM, UNSPECIFIED TYPE: ICD-10-CM

## 2018-12-18 PROCEDURE — 99214 OFFICE O/P EST MOD 30 MIN: CPT | Performed by: FAMILY MEDICINE

## 2018-12-18 PROCEDURE — 90653 IIV ADJUVANT VACCINE IM: CPT | Performed by: FAMILY MEDICINE

## 2018-12-18 PROCEDURE — 80053 COMPREHEN METABOLIC PANEL: CPT | Performed by: FAMILY MEDICINE

## 2018-12-18 PROCEDURE — 85025 COMPLETE CBC W/AUTO DIFF WBC: CPT | Performed by: FAMILY MEDICINE

## 2018-12-18 PROCEDURE — 80061 LIPID PANEL: CPT | Performed by: FAMILY MEDICINE

## 2018-12-18 PROCEDURE — G0008 ADMIN INFLUENZA VIRUS VAC: HCPCS | Performed by: FAMILY MEDICINE

## 2018-12-18 PROCEDURE — 84443 ASSAY THYROID STIM HORMONE: CPT | Performed by: FAMILY MEDICINE

## 2018-12-18 NOTE — PROGRESS NOTES
[facility] [place]  PROGRESS NOTE        HPI:   This is a 80year old female coming in for Patient presents with:   Follow - Up: 6mo f/ px    HPI had a couple of dizzy spells vist was a few weeks ago, and usually when she first gets up and has on 43.8 35.1 - 46.3 fL    Neutrophil Absolute Prelim 5.02 1.30 - 6.70 x10 (3) uL    Neutrophil Absolute 5.02 1.30 - 6.70 x10(3) uL    Lymphocyte Absolute 2.41 0.90 - 4.00 x10(3) uL    Monocyte Absolute 0.67 0.10 - 1.00 x10(3) uL    Eosinophil Absolute 0.11 0. Take by mouth. Disp:  Rfl:    acetaminophen 325 MG Oral Tab Take 2 tablets by mouth every 6 (six) hours as needed. Disp:  Rfl:    multivitamin Oral Tab Take 1 tablet by mouth daily. Disp:  Rfl:    aspirin 325 MG Oral Tab Take 1 tablet by mouth daily.   Disp 9.6 oz  06/25/18 : 174 lb 9.6 oz  01/05/18 : 166 lb 12.8 oz  10/05/17 : 162 lb 3.2 oz  08/24/17 : 159 lb 9.6 oz  08/08/17 : 160 lb 3.2 oz     Vital signs reviewed. Physical Exam   Constitutional: She is oriented to person, place, and time.  She appears wel VACCINE ADJUVANT IM          No problem-specific Assessment & Plan notes found for this encounter. future appointment:    Return in about 6 months (around 6/18/2019).     Meds & Refills for this Visit:  Requested Prescriptions      No prescriptions re

## 2018-12-21 ENCOUNTER — TELEPHONE (OUTPATIENT)
Dept: FAMILY MEDICINE CLINIC | Facility: CLINIC | Age: 83
End: 2018-12-21

## 2018-12-21 NOTE — TELEPHONE ENCOUNTER
Patient's son Stephenie Obregon notified of results and recommendations and expressed understanding.     Para Och, 12/21/18, 3:49 PM    Your Appointments    Tuesday June 18, 2019  4:00 PM CDT  Follow up with Librado Beckman MD  [address] [place] [address]

## 2018-12-21 NOTE — TELEPHONE ENCOUNTER
----- Message from Deejay Coronado MD sent at 12/20/2018  8:47 AM CST -----  Labs look good. Continue present managment   Follow up in 6 months.

## 2019-01-03 NOTE — TELEPHONE ENCOUNTER
OK to sign encounter. Dr Leodan Grider notified and Plan of Care was faxed over and signed by Dr Leodan Grider. See Med Rec Scan encounter 8/16/18.

## 2019-01-15 DIAGNOSIS — I10 ESSENTIAL HYPERTENSION: ICD-10-CM

## 2019-01-15 RX ORDER — METOPROLOL SUCCINATE 25 MG/1
25 TABLET, EXTENDED RELEASE ORAL DAILY
Qty: 90 TABLET | Refills: 1 | Status: SHIPPED | OUTPATIENT
Start: 2019-01-15 | End: 2019-07-02

## 2019-01-15 NOTE — TELEPHONE ENCOUNTER
Future appt:     Your appointments     Date & Time Appointment Department [facility])    Jun 18, 2019  4:00 PM CDT Follow up with Presley Jones MD [address] [place], [place] [address]way, [place] ([facility])        [address] [facility]

## 2019-04-02 RX ORDER — LEVOTHYROXINE SODIUM 0.12 MG/1
TABLET ORAL
Qty: 30 TABLET | Refills: 5 | Status: SHIPPED | OUTPATIENT
Start: 2019-04-02 | End: 2019-04-03

## 2019-04-02 NOTE — TELEPHONE ENCOUNTER
Future appt:     Your appointments     Date & Time Appointment Department [facility])    Apr 03, 2019  9:45 AM CDT Exam - Established Patient with Lore Berry, [address], [place] ([facility])    Jul 02, 20

## 2019-04-03 ENCOUNTER — OFFICE VISIT (OUTPATIENT)
Dept: FAMILY MEDICINE CLINIC | Facility: CLINIC | Age: 84
End: 2019-04-03
Payer: MEDICARE

## 2019-04-03 VITALS
WEIGHT: 165.81 LBS | OXYGEN SATURATION: 95 % | RESPIRATION RATE: 18 BRPM | TEMPERATURE: 99 F | DIASTOLIC BLOOD PRESSURE: 66 MMHG | BODY MASS INDEX: 29.75 KG/M2 | HEIGHT: 62.5 IN | HEART RATE: 92 BPM | SYSTOLIC BLOOD PRESSURE: 138 MMHG

## 2019-04-03 DIAGNOSIS — J31.0 RHINITIS, UNSPECIFIED TYPE: Primary | ICD-10-CM

## 2019-04-03 DIAGNOSIS — I10 ESSENTIAL HYPERTENSION: ICD-10-CM

## 2019-04-03 DIAGNOSIS — R42 DIZZINESS: ICD-10-CM

## 2019-04-03 PROCEDURE — 99213 OFFICE O/P EST LOW 20 MIN: CPT | Performed by: FAMILY MEDICINE

## 2019-04-03 RX ORDER — LEVOTHYROXINE SODIUM 0.12 MG/1
TABLET ORAL
Qty: 90 TABLET | Refills: 1 | Status: SHIPPED | OUTPATIENT
Start: 2019-04-03 | End: 2019-11-05 | Stop reason: DRUGHIGH

## 2019-04-03 NOTE — PROGRESS NOTES
[facility] [place]  PROGRESS NOTE  Chief Complaint:   Patient presents with:  Dizziness: Head spinning in the morning when she tries to get out of bed      HPI:   This is a 80year old female coming in for discussion  Of dizziness -- occasional- - 59 mg/dL    Triglycerides 146 30 - 149 mg/dL    LDL Cholesterol 47 <100 mg/dL    VLDL 29 0 - 30 mg/dL    Non HDL Chol 76 <130 mg/dL   CBC W/ DIFFERENTIAL   Result Value Ref Range    WBC 8.3 4.0 - 13.0 x10(3) uL    RBC 4.86 3.80 - 5.10 x10(6)uL    HGB 15. History Narrative       Son, crystal Byrd to make decisions in the event she is unable [phone]      Patient states that she wishes to be DNR. Discussed placement of form on fridge.      Family History:  Family History   Problem Relation Age of O aches, back pain, joint pain, swelling or stiffness. NEUROLOGICAL:  Denies headache, seizures, dizziness, syncope, paralysis, ataxia, numbness or tingling in the extremities  HEMATOLOGIC:  Denies anemia, bleeding or bruising.   LYMPHATICS:  Denies enlarged positional and short lasting discussed with patient increasing water intake son and caregivers have been trying to do better by mixing water with fruit juice to get more free liquid in.    3. Essential hypertension  Pressure stable patient with no signific

## 2019-04-03 NOTE — PATIENT INSTRUCTIONS
rec zyrtec daily for rhinitis    Monitor if not better after a week-- ADD flonase nasal spray    If still issues call us back

## 2019-04-15 DIAGNOSIS — I10 ESSENTIAL HYPERTENSION: ICD-10-CM

## 2019-04-15 RX ORDER — METOPROLOL SUCCINATE 25 MG/1
TABLET, EXTENDED RELEASE ORAL
Qty: 90 TABLET | Refills: 5 | Status: SHIPPED | OUTPATIENT
Start: 2019-04-15 | End: 2019-11-05

## 2019-04-15 NOTE — TELEPHONE ENCOUNTER
Future appt:     Your appointments     Date & Time Appointment Department [facility])    Jul 02, 2019  4:00 PM CDT Medicare Annual Well Visit with Isabell Orona MD [address] [place], [place] [address]            [facility]

## 2019-07-02 ENCOUNTER — OFFICE VISIT (OUTPATIENT)
Dept: FAMILY MEDICINE CLINIC | Facility: CLINIC | Age: 84
End: 2019-07-02
Payer: MEDICARE

## 2019-07-02 VITALS
OXYGEN SATURATION: 97 % | TEMPERATURE: 100 F | HEIGHT: 62.5 IN | BODY MASS INDEX: 28.85 KG/M2 | RESPIRATION RATE: 24 BRPM | WEIGHT: 160.81 LBS | DIASTOLIC BLOOD PRESSURE: 80 MMHG | HEART RATE: 102 BPM | SYSTOLIC BLOOD PRESSURE: 136 MMHG

## 2019-07-02 DIAGNOSIS — E78.49 FAMILIAL MULTIPLE LIPOPROTEIN-TYPE HYPERLIPIDEMIA: ICD-10-CM

## 2019-07-02 DIAGNOSIS — Z00.00 ENCOUNTER FOR ANNUAL HEALTH EXAMINATION: Primary | ICD-10-CM

## 2019-07-02 DIAGNOSIS — I10 ESSENTIAL HYPERTENSION: ICD-10-CM

## 2019-07-02 DIAGNOSIS — M81.0 AGE-RELATED OSTEOPOROSIS WITHOUT CURRENT PATHOLOGICAL FRACTURE: ICD-10-CM

## 2019-07-02 DIAGNOSIS — S32.509D CLOSED FRACTURE OF PUBIS WITH ROUTINE HEALING, UNSPECIFIED PORTION OF PUBIS, UNSPECIFIED LATERALITY, SUBSEQUENT ENCOUNTER: ICD-10-CM

## 2019-07-02 DIAGNOSIS — F02.80 LATE ONSET ALZHEIMER'S DISEASE WITHOUT BEHAVIORAL DISTURBANCE (HCC): ICD-10-CM

## 2019-07-02 DIAGNOSIS — E03.9 ACQUIRED HYPOTHYROIDISM: ICD-10-CM

## 2019-07-02 DIAGNOSIS — R53.1 WEAKNESS: ICD-10-CM

## 2019-07-02 DIAGNOSIS — G30.1 LATE ONSET ALZHEIMER'S DISEASE WITHOUT BEHAVIORAL DISTURBANCE (HCC): ICD-10-CM

## 2019-07-02 LAB
ALBUMIN SERPL-MCNC: 3 G/DL (ref 3.4–5)
ALBUMIN/GLOB SERPL: 0.8 {RATIO} (ref 1–2)
ALP LIVER SERPL-CCNC: 91 U/L (ref 55–142)
ALT SERPL-CCNC: 23 U/L (ref 13–56)
ANION GAP SERPL CALC-SCNC: 10 MMOL/L (ref 0–18)
AST SERPL-CCNC: 23 U/L (ref 15–37)
BASOPHILS # BLD AUTO: 0.04 X10(3) UL (ref 0–0.2)
BASOPHILS NFR BLD AUTO: 0.4 %
BILIRUB SERPL-MCNC: 0.3 MG/DL (ref 0.1–2)
BUN BLD-MCNC: 18 MG/DL (ref 7–18)
BUN/CREAT SERPL: 19.6 (ref 10–20)
CALCIUM BLD-MCNC: 9.2 MG/DL (ref 8.5–10.1)
CHLORIDE SERPL-SCNC: 109 MMOL/L (ref 98–112)
CHOLEST SMN-MCNC: 122 MG/DL (ref ?–200)
CK SERPL-CCNC: 40 U/L (ref 26–192)
CO2 SERPL-SCNC: 21 MMOL/L (ref 21–32)
CREAT BLD-MCNC: 0.92 MG/DL (ref 0.55–1.02)
DEPRECATED RDW RBC AUTO: 45.5 FL (ref 35.1–46.3)
EOSINOPHIL # BLD AUTO: 0.1 X10(3) UL (ref 0–0.7)
EOSINOPHIL NFR BLD AUTO: 1 %
ERYTHROCYTE [DISTWIDTH] IN BLOOD BY AUTOMATED COUNT: 12.9 % (ref 11–15)
GLOBULIN PLAS-MCNC: 3.9 G/DL (ref 2.8–4.4)
GLUCOSE BLD-MCNC: 109 MG/DL (ref 70–99)
HCT VFR BLD AUTO: 46.6 % (ref 35–48)
HDLC SERPL-MCNC: 41 MG/DL (ref 40–59)
HGB BLD-MCNC: 15.2 G/DL (ref 12–16)
IMM GRANULOCYTES # BLD AUTO: 0.02 X10(3) UL (ref 0–1)
IMM GRANULOCYTES NFR BLD: 0.2 %
LDLC SERPL CALC-MCNC: 48 MG/DL (ref ?–100)
LYMPHOCYTES # BLD AUTO: 2.39 X10(3) UL (ref 1–4)
LYMPHOCYTES NFR BLD AUTO: 23.5 %
M PROTEIN MFR SERPL ELPH: 6.9 G/DL (ref 6.4–8.2)
MCH RBC QN AUTO: 31.3 PG (ref 26–34)
MCHC RBC AUTO-ENTMCNC: 32.6 G/DL (ref 31–37)
MCV RBC AUTO: 96.1 FL (ref 80–100)
MONOCYTES # BLD AUTO: 0.77 X10(3) UL (ref 0.1–1)
MONOCYTES NFR BLD AUTO: 7.6 %
MULTISTIX LOT#: NORMAL NUMERIC
NEUTROPHILS # BLD AUTO: 6.87 X10 (3) UL (ref 1.5–7.7)
NEUTROPHILS # BLD AUTO: 6.87 X10(3) UL (ref 1.5–7.7)
NEUTROPHILS NFR BLD AUTO: 67.3 %
NONHDLC SERPL-MCNC: 81 MG/DL (ref ?–130)
OSMOLALITY SERPL CALC.SUM OF ELEC: 292 MOSM/KG (ref 275–295)
PH, URINE: 6 (ref 4.5–8)
PLATELET # BLD AUTO: 219 10(3)UL (ref 150–450)
POTASSIUM SERPL-SCNC: 4.2 MMOL/L (ref 3.5–5.1)
PROTEIN (URINE DIPSTICK): 100 MG/DL
RBC # BLD AUTO: 4.85 X10(6)UL (ref 3.8–5.3)
SODIUM SERPL-SCNC: 140 MMOL/L (ref 136–145)
SPECIFIC GRAVITY: 1.02 (ref 1–1.03)
T4 FREE SERPL-MCNC: 1.8 NG/DL (ref 0.8–1.7)
TRIGL SERPL-MCNC: 163 MG/DL (ref 30–149)
TSI SER-ACNC: 0.21 MIU/ML (ref 0.36–3.74)
URINE-COLOR: YELLOW
UROBILINOGEN,SEMI-QN: 0.2 MG/DL (ref 0–1.9)
VIT B12 SERPL-MCNC: 1057 PG/ML (ref 193–986)
VIT D+METAB SERPL-MCNC: 36.6 NG/ML (ref 30–100)
VLDLC SERPL CALC-MCNC: 33 MG/DL (ref 0–30)
WBC # BLD AUTO: 10.2 X10(3) UL (ref 4–11)

## 2019-07-02 PROCEDURE — 84443 ASSAY THYROID STIM HORMONE: CPT | Performed by: FAMILY MEDICINE

## 2019-07-02 PROCEDURE — 80061 LIPID PANEL: CPT | Performed by: FAMILY MEDICINE

## 2019-07-02 PROCEDURE — 99497 ADVNCD CARE PLAN 30 MIN: CPT | Performed by: FAMILY MEDICINE

## 2019-07-02 PROCEDURE — 82550 ASSAY OF CK (CPK): CPT | Performed by: FAMILY MEDICINE

## 2019-07-02 PROCEDURE — 82607 VITAMIN B-12: CPT | Performed by: FAMILY MEDICINE

## 2019-07-02 PROCEDURE — G0439 PPPS, SUBSEQ VISIT: HCPCS | Performed by: FAMILY MEDICINE

## 2019-07-02 PROCEDURE — 80053 COMPREHEN METABOLIC PANEL: CPT | Performed by: FAMILY MEDICINE

## 2019-07-02 PROCEDURE — 82306 VITAMIN D 25 HYDROXY: CPT | Performed by: FAMILY MEDICINE

## 2019-07-02 PROCEDURE — 81003 URINALYSIS AUTO W/O SCOPE: CPT | Performed by: FAMILY MEDICINE

## 2019-07-02 PROCEDURE — 84439 ASSAY OF FREE THYROXINE: CPT | Performed by: FAMILY MEDICINE

## 2019-07-02 PROCEDURE — 85025 COMPLETE CBC W/AUTO DIFF WBC: CPT | Performed by: FAMILY MEDICINE

## 2019-07-02 PROCEDURE — 99214 OFFICE O/P EST MOD 30 MIN: CPT | Performed by: FAMILY MEDICINE

## 2019-07-02 RX ORDER — CEPHALEXIN 500 MG/1
500 CAPSULE ORAL 3 TIMES DAILY
Qty: 21 CAPSULE | Refills: 0 | Status: SHIPPED | OUTPATIENT
Start: 2019-07-02 | End: 2019-11-05 | Stop reason: ALTCHOICE

## 2019-07-02 NOTE — PROGRESS NOTES
HPI:   Dominic Finley is a 80year old female who presents for a Medicare Subsequent Annual Wellness visit (Pt already had Initial Annual Wellness). Piedad Caldwell about 2 months ago,   Has a caregiver at home. Ordinarily doesn't get up on her own.    Strength Incorrect  Recall \"Flag\": Correct  Recall \"Tree\": Incorrect       Functional Ability/Status   Chris Hernandez has some abnormal functions as listed below:  She has Dressing and/or Bathing issues based on screening of functional status.    Difficulty dres patient has this document but we do not have it in [facility], and patient is instructed to get our office a copy of it for scanning into Epic. She does have a POA but we do NOT have it on file in [address] [place] [address] Rd.    The patient has this document but we do not have 12/18/2018    HGB 15.5 12/18/2018    .0 12/18/2018        ALLERGIES:   She has No Known Allergies.     CURRENT MEDICATIONS:     Outpatient Medications Marked as Taking for the 7/2/19 encounter (Office Visit) with Karen Rosado MD:  METOPROLOL SUCCIN and decreased gait abilaity. History is from patient and son.       EXAM:   /80 (BP Location: Left arm, Patient Position: Sitting, Cuff Size: adult)   Pulse 102   Temp 99.6 °F (37.6 °C) (Temporal)   Resp 24   Ht 62.5\"   Wt 160 lb 12.8 oz   SpO2 97% who presents for a Medicare Assessment.      PLAN SUMMARY:   Diagnoses and all orders for this visit:    Encounter for annual health examination  -     ADVANCE CARE PLANNING FIRST 30 MINS    Weakness  -     HOME HEALTH (EXT)  -     CK CREATINE KINASE (NOT C General Health     In the past six months, have you lost more than 10 pounds without trying?: 2 - No  Has your appetite been poor?: No  How does the patient maintain a good energy level?: Appropriate Exercise  How would you describe your daily physical Mammogram      Mammogram  Annually to 76, then as discussed There are no preventive care reminders to display for this patient.  Update Health Maintenance if applicable     Immunizations (Update Immunization Activity if applicable)     Influenza  Covered An

## 2019-07-02 NOTE — PATIENT INSTRUCTIONS
Wander Sung's SCREENING SCHEDULE   Tests on this list are recommended by your physician but may not be covered, or covered at this frequency, by your insurer. Please check with your insurance carrier before scheduling to verify coverage.    PREVENTATIV abnormal There are no preventive care reminders to display for this patient. Update Health Maintenance if applicable    Flex Sigmoidoscopy Screen  Covered every 5 years No results found for this or any previous visit. No flowsheet data found.      Fecal Occ Pneumococcal 23 (Pneumovax)  Covered Once after 65 No orders found for this or any previous visit. Please get once after your 65th birthday    Hepatitis B for Moderate/High Risk       No orders found for this or any previous visit.  Medium/high risk fact

## 2019-07-03 ENCOUNTER — TELEPHONE (OUTPATIENT)
Dept: FAMILY MEDICINE CLINIC | Facility: CLINIC | Age: 84
End: 2019-07-03

## 2019-07-03 RX ORDER — LEVOTHYROXINE SODIUM 112 UG/1
112 TABLET ORAL
Qty: 90 TABLET | Refills: 0 | Status: SHIPPED | OUTPATIENT
Start: 2019-07-03 | End: 2019-10-17 | Stop reason: DRUGHIGH

## 2019-07-03 NOTE — TELEPHONE ENCOUNTER
----- Message from Nabeel Esquivel MD sent at 7/3/2019  1:32 PM CDT -----  Decrease levothyroxine to 112 mcg daily. Recheck thyroid in 3 months.

## 2019-07-03 NOTE — TELEPHONE ENCOUNTER
Patient's son Rock Corona informed of the below results and recommendations and verbalized understanding. Levothyroxine prescription sent to [facility] per Gaurang's request.     Thyroid flow sheet updated.

## 2019-07-11 DIAGNOSIS — E78.49 FAMILIAL MULTIPLE LIPOPROTEIN-TYPE HYPERLIPIDEMIA: ICD-10-CM

## 2019-07-11 RX ORDER — ROSUVASTATIN CALCIUM 5 MG/1
TABLET, COATED ORAL
Qty: 90 TABLET | Refills: 1 | Status: SHIPPED | OUTPATIENT
Start: 2019-07-11 | End: 2019-11-05

## 2019-07-11 NOTE — TELEPHONE ENCOUNTER
Future appt:     Your appointments     Date & Time Appointment Department [facility])    Jan 07, 2020  4:00 PM CST Follow up with Brtitany Wang MD [facility], [facility] ([facility])            [address] [place] [address]

## 2019-07-19 ENCOUNTER — TELEPHONE (OUTPATIENT)
Dept: FAMILY MEDICINE CLINIC | Facility: CLINIC | Age: 84
End: 2019-07-19

## 2019-07-19 NOTE — TELEPHONE ENCOUNTER
Would like Dr to fax patients demo sheet, [address], med list, most recent note to [phone]. Can call if there;s any questions.

## 2019-07-22 ENCOUNTER — TELEPHONE (OUTPATIENT)
Dept: FAMILY MEDICINE CLINIC | Facility: CLINIC | Age: 84
End: 2019-07-22

## 2019-07-22 NOTE — TELEPHONE ENCOUNTER
BARBI up. Thru [facility]. Will return call as she is out of the office for the remainder of the day.

## 2019-07-23 ENCOUNTER — TELEPHONE (OUTPATIENT)
Dept: FAMILY MEDICINE CLINIC | Facility: CLINIC | Age: 84
End: 2019-07-23

## 2019-07-29 NOTE — TELEPHONE ENCOUNTER
Dr. Leodan Grider is out of the office today. Please follow-up on the phone call - I do not know what needs clarification. Thank you.

## 2019-08-12 ENCOUNTER — TELEPHONE (OUTPATIENT)
Dept: FAMILY MEDICINE CLINIC | Facility: CLINIC | Age: 84
End: 2019-08-12

## 2019-08-12 NOTE — TELEPHONE ENCOUNTER
Son states they called [address] [place] [address] and states he was told they do not have the order for patient from Dr Kamille Escalante.  Please send order for patient

## 2019-08-12 NOTE — TELEPHONE ENCOUNTER
Pt's son stated that per [facility], order received is no adequate enough. [facility] contacted and spoke with rep. Steve Huynh who stated that the current order needs to be more specific upon what services are needed: for example, nursing, PT, OT, S

## 2019-08-13 ENCOUNTER — TELEPHONE (OUTPATIENT)
Dept: FAMILY MEDICINE CLINIC | Facility: CLINIC | Age: 84
End: 2019-08-13

## 2019-08-19 ENCOUNTER — TELEPHONE (OUTPATIENT)
Dept: FAMILY MEDICINE CLINIC | Facility: CLINIC | Age: 84
End: 2019-08-19

## 2019-08-19 NOTE — TELEPHONE ENCOUNTER
[place] @ [facility] in regards to therapy visits. Rep states that after her initial review, she feels that pt would need the below number visits. No other questions at this time.

## 2019-08-27 ENCOUNTER — TELEPHONE (OUTPATIENT)
Dept: FAMILY MEDICINE CLINIC | Facility: CLINIC | Age: 84
End: 2019-08-27

## 2019-08-27 NOTE — TELEPHONE ENCOUNTER
Call placed to Nicolás Steinberg her [facility]:     Regarding request to discontinue the MSW visits, a  Plan for OT and PT and wanted speech therapy as well. [id number] for no further  visits. Would like speech therapy.

## 2019-08-28 ENCOUNTER — TELEPHONE (OUTPATIENT)
Dept: FAMILY MEDICINE CLINIC | Facility: CLINIC | Age: 84
End: 2019-08-28

## 2019-08-28 NOTE — TELEPHONE ENCOUNTER
fell this am while washing her hands     no injuries / vitals are normal          FYI    only if questions c/b

## 2019-09-12 ENCOUNTER — TELEPHONE (OUTPATIENT)
Dept: FAMILY MEDICINE CLINIC | Facility: CLINIC | Age: 84
End: 2019-09-12

## 2019-09-12 NOTE — TELEPHONE ENCOUNTER
[place] thru [facility] contacted and given verbal to continue home health services as stated below per [address]

## 2019-09-12 NOTE — TELEPHONE ENCOUNTER
Since Dr. Leodan Grider is out of the office please advise if ok to give verbal order to extend home health services, once a week x 3 weeks.

## 2019-10-04 ENCOUNTER — TELEPHONE (OUTPATIENT)
Dept: FAMILY MEDICINE CLINIC | Facility: CLINIC | Age: 84
End: 2019-10-04

## 2019-10-04 NOTE — TELEPHONE ENCOUNTER
FYI
Patient is now done with occupational therapy and she will start with physical therapy
DISPLAY PLAN FREE TEXT

## 2019-10-08 ENCOUNTER — TELEPHONE (OUTPATIENT)
Dept: FAMILY MEDICINE CLINIC | Facility: CLINIC | Age: 84
End: 2019-10-08

## 2019-10-08 NOTE — TELEPHONE ENCOUNTER
Pt's son calling and stated that pt is experiencing left shoulder/arm pain. Pt's son denies any fall or injury to pt that would create pain/discomfort. Son stated that pt slept thru the night ok.  This morning pt is more \"stiff\" and complaining more with

## 2019-10-09 ENCOUNTER — TELEPHONE (OUTPATIENT)
Dept: FAMILY MEDICINE CLINIC | Facility: CLINIC | Age: 84
End: 2019-10-09

## 2019-10-09 ENCOUNTER — OFFICE VISIT (OUTPATIENT)
Dept: FAMILY MEDICINE CLINIC | Facility: CLINIC | Age: 84
End: 2019-10-09
Payer: MEDICARE

## 2019-10-09 VITALS
RESPIRATION RATE: 16 BRPM | DIASTOLIC BLOOD PRESSURE: 74 MMHG | TEMPERATURE: 97 F | SYSTOLIC BLOOD PRESSURE: 136 MMHG | OXYGEN SATURATION: 96 % | HEART RATE: 88 BPM

## 2019-10-09 DIAGNOSIS — M25.512 ACUTE PAIN OF LEFT SHOULDER: Primary | ICD-10-CM

## 2019-10-09 DIAGNOSIS — Z87.440 HX: UTI (URINARY TRACT INFECTION): ICD-10-CM

## 2019-10-09 PROCEDURE — 87086 URINE CULTURE/COLONY COUNT: CPT | Performed by: NURSE PRACTITIONER

## 2019-10-09 PROCEDURE — 81001 URINALYSIS AUTO W/SCOPE: CPT | Performed by: NURSE PRACTITIONER

## 2019-10-09 PROCEDURE — 99213 OFFICE O/P EST LOW 20 MIN: CPT | Performed by: NURSE PRACTITIONER

## 2019-10-09 NOTE — PROGRESS NOTES
HPI:    Patient ID: Chris Hernandez is a 80year old female. HPI     Patient is present with son and caregiver for a follow-up on left shoulder pain. Was in the ER yesterday and had x-ray and EKG. No acute findings.  Patient woke with some pain to the scarlet Constitutional: She appears well-developed and well-nourished. HENT:   Head: Normocephalic and atraumatic. Neck: Normal range of motion. Neck supple. Cardiovascular: Normal rate, regular rhythm and normal heart sounds.    Pulmonary/Chest: Effort poornima

## 2019-10-09 NOTE — TELEPHONE ENCOUNTER
Branden would like verbal approval from  for recertification for physical therapy at home for patient. Would like a call back.

## 2019-10-10 ENCOUNTER — TELEPHONE (OUTPATIENT)
Dept: FAMILY MEDICINE CLINIC | Facility: CLINIC | Age: 84
End: 2019-10-10

## 2019-10-10 NOTE — TELEPHONE ENCOUNTER
Detailed message left for Eldno Cunha with PT for verbal approval per Dr. Tru Pérez for pt to receive in home PT sessions.

## 2019-10-10 NOTE — TELEPHONE ENCOUNTER
Call to son Garry,   Monday when he got back from [facility]. And her left shoulder was ore, and gave her a tylenol and aspercreme and slept well.  Then Tuesday and was complaining of pain in the shoulder and radiating down to her elbow and left arm and lower back

## 2019-10-11 ENCOUNTER — TELEPHONE (OUTPATIENT)
Dept: FAMILY MEDICINE CLINIC | Facility: CLINIC | Age: 84
End: 2019-10-11

## 2019-10-11 NOTE — TELEPHONE ENCOUNTER
----- Message from DARREL Lerma sent at 10/11/2019  7:12 AM CDT -----  Urine shows contamination - no infection. Please let patient or caregiver know. Thank you.

## 2019-10-15 ENCOUNTER — OFFICE VISIT (OUTPATIENT)
Dept: FAMILY MEDICINE CLINIC | Facility: CLINIC | Age: 84
End: 2019-10-15
Payer: MEDICARE

## 2019-10-15 ENCOUNTER — APPOINTMENT (OUTPATIENT)
Dept: LAB | Age: 84
End: 2019-10-15
Attending: FAMILY MEDICINE
Payer: MEDICARE

## 2019-10-15 VITALS
TEMPERATURE: 99 F | HEART RATE: 68 BPM | RESPIRATION RATE: 16 BRPM | SYSTOLIC BLOOD PRESSURE: 136 MMHG | OXYGEN SATURATION: 94 % | DIASTOLIC BLOOD PRESSURE: 78 MMHG

## 2019-10-15 DIAGNOSIS — R07.89 CHEST DISCOMFORT: ICD-10-CM

## 2019-10-15 DIAGNOSIS — E03.9 ACQUIRED HYPOTHYROIDISM: ICD-10-CM

## 2019-10-15 DIAGNOSIS — I10 ESSENTIAL HYPERTENSION: Primary | ICD-10-CM

## 2019-10-15 PROCEDURE — 82550 ASSAY OF CK (CPK): CPT | Performed by: FAMILY MEDICINE

## 2019-10-15 PROCEDURE — 84443 ASSAY THYROID STIM HORMONE: CPT | Performed by: FAMILY MEDICINE

## 2019-10-15 PROCEDURE — 80053 COMPREHEN METABOLIC PANEL: CPT | Performed by: FAMILY MEDICINE

## 2019-10-15 PROCEDURE — 93000 ELECTROCARDIOGRAM COMPLETE: CPT | Performed by: FAMILY MEDICINE

## 2019-10-15 PROCEDURE — 36415 COLL VENOUS BLD VENIPUNCTURE: CPT | Performed by: FAMILY MEDICINE

## 2019-10-15 PROCEDURE — 99214 OFFICE O/P EST MOD 30 MIN: CPT | Performed by: FAMILY MEDICINE

## 2019-10-15 PROCEDURE — 85025 COMPLETE CBC W/AUTO DIFF WBC: CPT | Performed by: FAMILY MEDICINE

## 2019-10-15 NOTE — PROGRESS NOTES
[facility] [place]  PROGRESS NOTE        HPI:   This is a 80year old female coming in for Patient presents with: Follow - Up    HPI  His blood pressure has been up and down   It has not been stable.         Monday night went to bed with shoulde 2.0 mg/dL    Nitrite Urine Negative Negative    Leukocyte Esterase Urine Moderate (A) Negative    WBC Urine 21-50 (A) <5 /HPF    RBC URINE 0-2 0 - 2 /HPF    Bacteria Urine None Seen None Seen    Squamous Epi.  Cells Large (A) Small /LPF    Renal Tubular Epi Disp: 90 tablet, Rfl: 0  cephALEXin (KEFLEX) 500 MG Oral Cap, Take 1 capsule (500 mg total) by mouth 3 (three) times daily. , Disp: 21 capsule, Rfl: 0  METOPROLOL SUCCINATE ER 25 MG Oral Tablet 24 Hr, TAKE 1 TABLET BY MOUTH DAILY, Disp: 90 tablet, Rfl: 5  L Neurological: Negative. Hematological: Negative. Psychiatric/Behavioral: Negative. All other systems reviewed and are negative.       EXAM:   /78 (BP Location: Left arm, Patient Position: Sitting, Cuff Size: adult)   Pulse 68   Temp 98.7 °F COMP METABOLIC PANEL (14); Future  -     CK CREATINE KINASE (NOT CREATININE);  Future  -     TSH W REFLEX TO FREE T4  -     CBC WITH DIFFERENTIAL WITH PLATELET  -     COMP METABOLIC PANEL (14)  -     CK CREATINE KINASE (NOT CREATININE)  -     CBC W/ DIFFERE Parent is to call with any side effects or complications from the treatments as a result of today.        Manas Carlson MD  10/15/2019  2:32 PM  Immunization History   Administered Date(s) Administered   • FLUAD High Dose 72 yr and older (71895) 10/05/2017,

## 2019-10-17 ENCOUNTER — TELEPHONE (OUTPATIENT)
Dept: FAMILY MEDICINE CLINIC | Facility: CLINIC | Age: 84
End: 2019-10-17

## 2019-10-17 RX ORDER — LEVOTHYROXINE SODIUM 88 UG/1
88 TABLET ORAL
Qty: 90 TABLET | Refills: 0 | Status: SHIPPED | OUTPATIENT
Start: 2019-10-17 | End: 2019-11-05

## 2019-10-17 NOTE — TELEPHONE ENCOUNTER
----- Message from Nabeel Esquivel MD sent at 10/16/2019  9:08 PM CDT -----  Hold synthroid x 2 days and then restart at 88 mcg daily. Send script to pharmacy of choice.

## 2019-10-17 NOTE — TELEPHONE ENCOUNTER
Pt's son Griselda Salinas notified and verbalized understanding. He states that new script should be sent to [facility]. Script sent per Dr. Nieto Plane order.

## 2019-11-05 ENCOUNTER — OFFICE VISIT (OUTPATIENT)
Dept: FAMILY MEDICINE CLINIC | Facility: CLINIC | Age: 84
End: 2019-11-05
Payer: MEDICARE

## 2019-11-05 VITALS
HEART RATE: 54 BPM | TEMPERATURE: 97 F | RESPIRATION RATE: 14 BRPM | DIASTOLIC BLOOD PRESSURE: 72 MMHG | SYSTOLIC BLOOD PRESSURE: 136 MMHG | OXYGEN SATURATION: 95 %

## 2019-11-05 DIAGNOSIS — F02.80 LATE ONSET ALZHEIMER'S DISEASE WITHOUT BEHAVIORAL DISTURBANCE (HCC): ICD-10-CM

## 2019-11-05 DIAGNOSIS — I10 ESSENTIAL HYPERTENSION: Primary | ICD-10-CM

## 2019-11-05 DIAGNOSIS — E03.9 HYPOTHYROIDISM, UNSPECIFIED TYPE: ICD-10-CM

## 2019-11-05 DIAGNOSIS — E78.49 FAMILIAL MULTIPLE LIPOPROTEIN-TYPE HYPERLIPIDEMIA: ICD-10-CM

## 2019-11-05 DIAGNOSIS — G30.1 LATE ONSET ALZHEIMER'S DISEASE WITHOUT BEHAVIORAL DISTURBANCE (HCC): ICD-10-CM

## 2019-11-05 PROCEDURE — G0008 ADMIN INFLUENZA VIRUS VAC: HCPCS | Performed by: FAMILY MEDICINE

## 2019-11-05 PROCEDURE — 90662 IIV NO PRSV INCREASED AG IM: CPT | Performed by: FAMILY MEDICINE

## 2019-11-05 PROCEDURE — 99214 OFFICE O/P EST MOD 30 MIN: CPT | Performed by: FAMILY MEDICINE

## 2019-11-05 RX ORDER — ROSUVASTATIN CALCIUM 5 MG/1
TABLET, COATED ORAL
Qty: 90 TABLET | Refills: 1 | Status: SHIPPED | OUTPATIENT
Start: 2019-11-05 | End: 2020-01-01

## 2019-11-05 RX ORDER — LISINOPRIL 20 MG/1
20 TABLET ORAL 2 TIMES DAILY
Qty: 180 TABLET | Refills: 1 | Status: SHIPPED | OUTPATIENT
Start: 2019-11-05 | End: 2020-01-01

## 2019-11-05 RX ORDER — METOPROLOL SUCCINATE 25 MG/1
25 TABLET, EXTENDED RELEASE ORAL
Qty: 90 TABLET | Refills: 1 | Status: SHIPPED | OUTPATIENT
Start: 2019-11-05 | End: 2020-01-01

## 2019-11-05 RX ORDER — LEVOTHYROXINE SODIUM 88 UG/1
88 TABLET ORAL
Qty: 90 TABLET | Refills: 1 | Status: SHIPPED | OUTPATIENT
Start: 2019-11-05 | End: 2020-01-01

## 2019-11-05 NOTE — PROGRESS NOTES
[facility] [place]  PROGRESS NOTE        HPI:   This is a 80year old female coming in for Patient presents with:  Medication Follow-Up: Blood pressure and thyroid medication    HPI her blood pressure at home is going well.   Mostly 113-143/60-70 1.00 x10(3) uL    Eosinophil Absolute 0.10 0.00 - 0.70 x10(3) uL    Basophil Absolute 0.06 0.00 - 0.20 x10(3) uL    Immature Granulocyte Absolute 0.02 0.00 - 1.00 x10(3) uL    Neutrophil % 63.5 %    Lymphocyte % 27.9 %    Monocyte % 6.8 %    Eosinophil % 1 acetaminophen 325 MG Oral Tab Take 2 tablets by mouth every 6 (six) hours as needed. • multivitamin Oral Tab Take 1 tablet by mouth daily. • aspirin 325 MG Oral Tab Take 1 tablet by mouth daily.       • Calcium 500 MG Oral Tab         Counseling giv 9.6 oz (77.4 kg)  06/25/18 : 174 lb 9.6 oz (79.2 kg)  01/05/18 : 166 lb 12.8 oz (75.7 kg)  10/05/17 : 162 lb 3.2 oz (73.6 kg)     BP Readings from Last 3 Encounters:  11/05/19 : 136/72  10/15/19 : 136/78  10/09/19 : 136/74       Vital signs reviewed.   Phys [facility] ([facility])            [address] [facility] [address] [id number] [phone]        No follow-ups on file.     Meds & Refills for [facility]

## 2020-01-01 ENCOUNTER — TELEPHONE (OUTPATIENT)
Dept: FAMILY MEDICINE CLINIC | Facility: CLINIC | Age: 85
End: 2020-01-01

## 2020-01-01 ENCOUNTER — APPOINTMENT (OUTPATIENT)
Dept: LAB | Age: 85
End: 2020-01-01
Attending: FAMILY MEDICINE
Payer: MEDICARE

## 2020-01-01 ENCOUNTER — LAB ENCOUNTER (OUTPATIENT)
Dept: LAB | Age: 85
End: 2020-01-01
Attending: FAMILY MEDICINE
Payer: MEDICARE

## 2020-01-01 ENCOUNTER — LABORATORY ENCOUNTER (OUTPATIENT)
Dept: LAB | Age: 85
End: 2020-01-01
Attending: FAMILY MEDICINE
Payer: MEDICARE

## 2020-01-01 ENCOUNTER — HOME HEALTH CHARGES (OUTPATIENT)
Dept: FAMILY MEDICINE CLINIC | Facility: CLINIC | Age: 85
End: 2020-01-01

## 2020-01-01 ENCOUNTER — OFFICE VISIT (OUTPATIENT)
Dept: FAMILY MEDICINE CLINIC | Facility: CLINIC | Age: 85
End: 2020-01-01
Payer: MEDICARE

## 2020-01-01 VITALS
BODY MASS INDEX: 24 KG/M2 | OXYGEN SATURATION: 95 % | SYSTOLIC BLOOD PRESSURE: 136 MMHG | DIASTOLIC BLOOD PRESSURE: 76 MMHG | HEART RATE: 75 BPM | TEMPERATURE: 98 F | RESPIRATION RATE: 18 BRPM | WEIGHT: 136 LBS

## 2020-01-01 VITALS
RESPIRATION RATE: 18 BRPM | HEART RATE: 65 BPM | OXYGEN SATURATION: 96 % | DIASTOLIC BLOOD PRESSURE: 70 MMHG | SYSTOLIC BLOOD PRESSURE: 118 MMHG | TEMPERATURE: 99 F

## 2020-01-01 DIAGNOSIS — Z00.00 ENCOUNTER FOR ANNUAL HEALTH EXAMINATION: Primary | ICD-10-CM

## 2020-01-01 DIAGNOSIS — G30.1 LATE ONSET ALZHEIMER'S DISEASE WITHOUT BEHAVIORAL DISTURBANCE (HCC): ICD-10-CM

## 2020-01-01 DIAGNOSIS — N39.498 OTHER URINARY INCONTINENCE: ICD-10-CM

## 2020-01-01 DIAGNOSIS — I48.20 CHRONIC ATRIAL FIBRILLATION (HCC): ICD-10-CM

## 2020-01-01 DIAGNOSIS — N18.2 CHRONIC KIDNEY DISEASE, STAGE II (MILD): ICD-10-CM

## 2020-01-01 DIAGNOSIS — E78.49 FAMILIAL MULTIPLE LIPOPROTEIN-TYPE HYPERLIPIDEMIA: ICD-10-CM

## 2020-01-01 DIAGNOSIS — F02.80 LATE ONSET ALZHEIMER'S DISEASE WITHOUT BEHAVIORAL DISTURBANCE (HCC): ICD-10-CM

## 2020-01-01 DIAGNOSIS — E03.9 HYPOTHYROIDISM, UNSPECIFIED TYPE: ICD-10-CM

## 2020-01-01 DIAGNOSIS — I10 ESSENTIAL HYPERTENSION: ICD-10-CM

## 2020-01-01 DIAGNOSIS — N39.41 URGE INCONTINENCE: ICD-10-CM

## 2020-01-01 DIAGNOSIS — M19.90 ARTHRITIS: ICD-10-CM

## 2020-01-01 DIAGNOSIS — R42 DIZZINESS AND GIDDINESS: ICD-10-CM

## 2020-01-01 DIAGNOSIS — R42 DIZZINESS AND GIDDINESS: Primary | ICD-10-CM

## 2020-01-01 DIAGNOSIS — I10 ESSENTIAL HYPERTENSION: Primary | ICD-10-CM

## 2020-01-01 DIAGNOSIS — N30.00 ACUTE CYSTITIS WITHOUT HEMATURIA: Primary | ICD-10-CM

## 2020-01-01 DIAGNOSIS — M54.6 PAIN IN THORACIC SPINE: Primary | ICD-10-CM

## 2020-01-01 DIAGNOSIS — E03.9 HYPOTHYROIDISM, UNSPECIFIED TYPE: Primary | ICD-10-CM

## 2020-01-01 PROCEDURE — 87086 URINE CULTURE/COLONY COUNT: CPT

## 2020-01-01 PROCEDURE — 84443 ASSAY THYROID STIM HORMONE: CPT | Performed by: FAMILY MEDICINE

## 2020-01-01 PROCEDURE — 82550 ASSAY OF CK (CPK): CPT | Performed by: FAMILY MEDICINE

## 2020-01-01 PROCEDURE — 80053 COMPREHEN METABOLIC PANEL: CPT | Performed by: FAMILY MEDICINE

## 2020-01-01 PROCEDURE — 80061 LIPID PANEL: CPT | Performed by: FAMILY MEDICINE

## 2020-01-01 PROCEDURE — 85025 COMPLETE CBC W/AUTO DIFF WBC: CPT | Performed by: FAMILY MEDICINE

## 2020-01-01 PROCEDURE — 82607 VITAMIN B-12: CPT | Performed by: FAMILY MEDICINE

## 2020-01-01 PROCEDURE — 87186 SC STD MICRODIL/AGAR DIL: CPT

## 2020-01-01 PROCEDURE — 36415 COLL VENOUS BLD VENIPUNCTURE: CPT | Performed by: FAMILY MEDICINE

## 2020-01-01 PROCEDURE — 84439 ASSAY OF FREE THYROXINE: CPT | Performed by: FAMILY MEDICINE

## 2020-01-01 PROCEDURE — 99497 ADVNCD CARE PLAN 30 MIN: CPT | Performed by: FAMILY MEDICINE

## 2020-01-01 PROCEDURE — G0439 PPPS, SUBSEQ VISIT: HCPCS | Performed by: FAMILY MEDICINE

## 2020-01-01 PROCEDURE — 81001 URINALYSIS AUTO W/SCOPE: CPT

## 2020-01-01 PROCEDURE — 87077 CULTURE AEROBIC IDENTIFY: CPT

## 2020-01-01 PROCEDURE — G0180 MD CERTIFICATION HHA PATIENT: HCPCS | Performed by: FAMILY MEDICINE

## 2020-01-01 PROCEDURE — G0179 MD RECERTIFICATION HHA PT: HCPCS | Performed by: FAMILY MEDICINE

## 2020-01-01 PROCEDURE — 99214 OFFICE O/P EST MOD 30 MIN: CPT | Performed by: FAMILY MEDICINE

## 2020-01-01 RX ORDER — CETIRIZINE HYDROCHLORIDE 10 MG/1
10 TABLET ORAL DAILY
COMMUNITY
End: 2020-01-01

## 2020-01-01 RX ORDER — METOPROLOL SUCCINATE 25 MG/1
25 TABLET, EXTENDED RELEASE ORAL
Qty: 90 TABLET | Refills: 1 | Status: SHIPPED | OUTPATIENT
Start: 2020-01-01 | End: 2021-01-01

## 2020-01-01 RX ORDER — CEPHALEXIN 500 MG/1
500 CAPSULE ORAL 3 TIMES DAILY
Qty: 30 CAPSULE | Refills: 0 | Status: SHIPPED | OUTPATIENT
Start: 2020-01-01 | End: 2020-01-01

## 2020-01-01 RX ORDER — LEVOTHYROXINE SODIUM 88 UG/1
88 TABLET ORAL
Qty: 90 TABLET | Refills: 1 | Status: SHIPPED | OUTPATIENT
Start: 2020-01-01

## 2020-01-01 RX ORDER — LEVOTHYROXINE SODIUM 0.1 MG/1
100 TABLET ORAL
Qty: 30 TABLET | Refills: 2 | Status: SHIPPED | OUTPATIENT
Start: 2020-01-01 | End: 2021-01-01

## 2020-01-01 RX ORDER — CEPHALEXIN 250 MG/1
250 CAPSULE ORAL 3 TIMES DAILY
Qty: 21 CAPSULE | Refills: 0 | Status: SHIPPED | OUTPATIENT
Start: 2020-01-01 | End: 2020-01-01

## 2020-01-01 RX ORDER — CEPHALEXIN 500 MG/1
500 CAPSULE ORAL 3 TIMES DAILY
COMMUNITY
End: 2020-01-01

## 2020-01-01 RX ORDER — ROSUVASTATIN CALCIUM 5 MG/1
TABLET, COATED ORAL
Qty: 90 TABLET | Refills: 1 | Status: SHIPPED | OUTPATIENT
Start: 2020-01-01

## 2020-01-01 RX ORDER — LISINOPRIL 20 MG/1
20 TABLET ORAL 2 TIMES DAILY
Qty: 180 TABLET | Refills: 1 | Status: SHIPPED | OUTPATIENT
Start: 2020-01-01 | End: 2021-01-01

## 2020-02-25 NOTE — TELEPHONE ENCOUNTER
Diltiazem LR - 11/5/19, 30 tab, 1 refill    LOV - 11/5/19 Med FU   7/2/19 Physical  NOV - 4/7/20 Follow-Up    Future appt:     Your appointments     Date & Time Appointment Department [facility])    Apr 07, 2020 11:30 AM CDT Follow Up Visit with Fina Leblanc,

## 2020-04-10 ENCOUNTER — TELEPHONE (OUTPATIENT)
Dept: FAMILY MEDICINE CLINIC | Facility: CLINIC | Age: 85
End: 2020-04-10

## 2020-04-27 NOTE — TELEPHONE ENCOUNTER
Future appt:     Your appointments     Date & Time Appointment Department [facility])    Jun 02, 2020  3:00 PM CDT Follow Up Visit with Presley Jones MD [address] [place] [address] Demian Flanagan ([place])            [employer]

## 2020-05-26 NOTE — PROGRESS NOTES
HPI:   Ede Rollins is a 80year old female who presents for a Medicare Subsequent Annual Wellness visit (Pt already had Initial Annual Wellness). Patient has not been out of the house since the last time she was in my office.   She thinks she lives help      She has Toileting difficulties based on screening of functional status. She has Eating difficulties based on screening of functional status. She has Driving difficulties based on screening of functional status.        She has Meal Prepar Diverticulosis of colon     Dizziness and giddiness     Familial multiple lipoprotein-type hyperlipidemia     Essential hypertension     Hypocalcemia     Hypothyroidism     Symptomatic menopausal or female climacteric states     Generalized osteoarthritis of Essential hypertension, Hyperlipidemia, Hypothyroidism, Kidney disease, Osteoarthritis, and Osteoporosis. She  has a past surgical history that includes thyroidectomy; total knee replacement; tonsillectomy; hip surgery; and cataract.     Her family hi HENT:   Head: Normocephalic and atraumatic. Right Ear: External ear normal.   Left Ear: External ear normal.   Nose: Nose normal.   Mouth/Throat: Oropharynx is clear and moist.   Bilateral hearing aids. Drums intact.       Eyes: Pupils are equal, r lisinopril 20 MG Oral Tab; Take 1 tablet (20 mg total) by mouth 2 (two) times daily.  -     Metoprolol Succinate ER 25 MG Oral Tablet 24 Hr; Take 1 tablet (25 mg total) by mouth once daily.     Familial multiple lipoprotein-type hyperlipidemia  -     Rosuva applicable    Flex Sigmoidoscopy Screen every 10 years No results found for this or any previous visit. No flowsheet data found. Fecal Occult Blood Annually No results found for: FOB No flowsheet data found.     Glaucoma Screening      Ophthalmology [facility] be covered with your pharmacy  prescription benefits      SPECIFIC DISEASE MONITORING Internal Lab or Procedure External Lab or Procedure      Annual Monitoring of Persistent     Medications (ACE/ARB, digoxin diuretics, anticonvulsants.)    Potassium  Nicolasa

## 2020-05-26 NOTE — PATIENT INSTRUCTIONS
Donte Sung's SCREENING SCHEDULE   Tests on this list are recommended by your physician but may not be covered, or covered at this frequency, by your insurer. Please check with your insurance carrier before scheduling to verify coverage.    PREVENTATIV more often if abnormal There are no preventive care reminders to display for this patient. Update Health Maintenance if applicable    Flex Sigmoidoscopy Screen  Covered every 5 years No results found for this or any previous visit. No flowsheet data found. previous visit. Please get once after your 65th birthday    Pneumococcal 23 (Pneumovax)  Covered Once after 65 No orders found for this or any previous visit.  Please get once after your 65th birthday    Hepatitis B for Moderate/High Risk       No orders fo

## 2020-05-27 NOTE — TELEPHONE ENCOUNTER
----- Message from Aamir Young MD sent at 5/26/2020  5:18 PM CDT -----  Labs look good for her. No change in therapy.

## 2020-06-04 NOTE — TELEPHONE ENCOUNTER
----- Message from Chaitanya Reed MD sent at 6/4/2020  8:25 AM CDT -----  Suspect patient has a UTI. Advised to increase fluid intake. Call if increasing pain or vomitting. Cranberry concentrate to help change PH to decrease infection risk.   Void afte

## 2020-06-05 NOTE — TELEPHONE ENCOUNTER
Spoke with son. Murrel Apgar states dementia definitely enhanced. Murrel Apgar agreed to start antx therapy at this time.     RX entered-  Please advise on quantity for pharmacy

## 2020-07-16 NOTE — TELEPHONE ENCOUNTER
pt's son is requesting to speak to Dr Genaro Robertson about getting pt back on [facility] and PT - pt does have sores on her bottom

## 2020-07-21 NOTE — TELEPHONE ENCOUNTER
States that they received referral, needs patients face to face, ins info and medical info faxed to [phone].

## 2020-07-23 NOTE — TELEPHONE ENCOUNTER
[facility] RN, states patient has pressure wound on inner left buttocks. Son has only been putting on barrier cream. She cleaned with NS and placed a foam gauze. Needs verbal order for protection.  James Enriquez recommended Solectron [employer]ovoRx, Barrier cream, Caryl Hartmann

## 2020-07-29 NOTE — TELEPHONE ENCOUNTER
Re:  order for speech therapy  -   Was seen last year for this and patient is declining . Looking for an order to treat patient once a week for 8 weeks.

## 2020-07-30 NOTE — TELEPHONE ENCOUNTER
Spoke with Tish Ojeda from [address] [facility]. She requires a verbal order for Speech Therapy and then she will fax and order to our office to be signed by a provider. Patient is having communication and cognition issues. Please advise.

## 2020-07-30 NOTE — TELEPHONE ENCOUNTER
Spoke with Manjit Palacios from [address] [employer] and advised her that MESSI Hollingsworth is ok with ordering speech therapy for the patient. Manjit Palacios will fax the appropriate orders for signature. No further questions or concerns.

## 2020-09-21 NOTE — TELEPHONE ENCOUNTER
Need Exam to determine medical necessity. Can we schedule within the next 30 days. Ok to continue speech therapy.

## 2020-09-21 NOTE — TELEPHONE ENCOUNTER
Speech Therap[facility] Ref[facility] for Speech Therapy x8 More Week. Please advise.   Sophie Mayers, 09/21/20, 3:11 PM

## 2020-09-22 NOTE — TELEPHONE ENCOUNTER
RE: speech therapy 1x wkly for 8 wks        verbal orders Okay     ( current orders has  - she feels Shon Moeller is making good progress with her )

## 2020-09-23 NOTE — TELEPHONE ENCOUNTER
Im completely ok with verbal order for continuing speech therapy, but think she may need reverification as I haven't seen her since May. Thanks. Left message for Therapist:  Radha To.    Spoke with son, he will schedule an appointment tomorrow to re

## 2020-09-24 NOTE — PATIENT INSTRUCTIONS
Will recertify physical therapy and speech therapy. Recheck in 3 months - sooner if needed.      Your Appointments    Tuesday December 22, 2020  2:20 PM  Exam - Established with Jaymie Venegas MD  [address] [place] [address]

## 2020-09-24 NOTE — PROGRESS NOTES
HPI:    Patient ID: Zunilda Morris is a 80year old female. HPI     Present for re-certification of speech and physical therapy in the home. She is present with her son. She is in a wheelchair and had a lot of difficulty to stand even briefly.   Ladi Other   SpO2: 95%   Weight: 145 lb 6.4 oz (66 kg)         Body mass index is 26.17 kg/m². Physical Exam   Constitutional: She appears well-developed and well-nourished. No distress. HENT:   Head: Normocephalic and atraumatic.    Right Ear: External e

## 2020-11-24 NOTE — TELEPHONE ENCOUNTER
Returned call to Shahid Rodriguez from [facility]. This was an fyi call. Shahid Rodriguez will call again if anything is needed.

## 2020-11-24 NOTE — TELEPHONE ENCOUNTER
had a fall on 11-20, slid down the wall bruise under her chin, right elbow and right knee, not complaining of pain, has a call out to physical therapy, re-evaluate?

## 2020-11-25 NOTE — TELEPHONE ENCOUNTER
Spoke with Lavon. She has received fax for continuation of therapy from Dr. Fransisca Bond.  No needs at this time

## 2020-12-08 NOTE — TELEPHONE ENCOUNTER
Future appt:     Your appointments     Date & Time Appointment Department [facility])    Dec 17, 2020  3:40 PM CST Exam - Established with Verner Cones, MD [facility] ([place])            [facility]

## 2020-12-17 NOTE — PROGRESS NOTES
[facility] [place]  PROGRESS NOTE        HPI:   This is a 80year old female coming in for Patient presents with: Follow - Up: medication, therapy    HPI  She is overall doing ok with her medication.   Taking all her mediations without difficult 31.5 26.0 - 34.0 pg    MCHC 32.1 31.0 - 37.0 g/dL    RDW 12.7 11.0 - 15.0 %    RDW-SD 45.4 35.1 - 46.3 fL    Neutrophil Absolute Prelim 5.49 1.50 - 7.70 x10 (3) uL    Neutrophil Absolute 5.49 1.50 - 7.70 x10(3) uL    Lymphocyte Absolute 1.97 1.00 - 4.00 x1 mg total) by mouth once daily. 90 tablet 1   • Rosuvastatin Calcium 5 MG Oral Tab TAKE 1 TABLET BY MOUTH NIGHTLY.  90 tablet 1   • Levothyroxine Sodium 88 MCG Oral Tab Take 1 tablet (88 mcg total) by mouth before breakfast. 90 tablet 1   • acetaminophen 325 signs reviewed. Physical Exam    ASSESSMENT AND PLAN:   Ariella Bearden was seen today for follow - up. Diagnoses and all orders for this visit:    Essential hypertension  -     CK CREATINE KINASE (NOT CREATININE); Future  -     COMP METABOLIC PANEL (14);  Future REFLEX; Future  -     VITAMIN B12; Future  -     CK CREATINE KINASE (NOT CREATININE)  -     COMP METABOLIC PANEL (14)  -     CBC WITH DIFFERENTIAL WITH PLATELET  -     LIPID PANEL  -     TSH W REFLEX TO FREE T4  -     VITAMIN B12    recheck in  6 months.

## 2020-12-18 NOTE — TELEPHONE ENCOUNTER
----- Message from Constance Rodriguez MD sent at 12/18/2020 10:02 AM CST -----  Stop crestor. Increase synthroid  Please call and verify current dosing.

## 2020-12-21 NOTE — TELEPHONE ENCOUNTER
Notified patient's son, Stevie YAMAP Mei, of below medication changes. [employer] verifies patient is taking Synthroid 88 mcg currently.

## 2020-12-22 NOTE — TELEPHONE ENCOUNTER
----- Message from DARREL Dong sent at 12/22/2020  2:30 PM CST -----  Dr. Javier Castle is out of the office today. Urine looks potentially positive for UTI, but the culture is pending. .  Please see if patient is having any UTI symptoms. Thank you.

## 2020-12-22 NOTE — TELEPHONE ENCOUNTER
Spoke with patients son Amanda Jacob. He states its hard to tell when she gets UTI's but she gets them frequently. Please see telephone note 12/18/20. Patient is taking Synthroid 88 mcg. Dr. Ary Gallego wanted to increase dose.  Mariajose Gutierrez is asking to send prescription w

## 2020-12-22 NOTE — TELEPHONE ENCOUNTER
Increased Synthroid to 100 mcg. Will await culture to make sure that patient is on an appropriate antibiotic. Recheck TSH in 6 - 8 weeks.

## 2020-12-24 NOTE — TELEPHONE ENCOUNTER
----- Message from DARREL Serna sent at 12/24/2020  9:38 AM CST -----  Urine positive for e coli. Recommend Keflex 250 mg three times a day for 7 days. Prescription sent to [facility].

## 2021-01-01 ENCOUNTER — TELEPHONE (OUTPATIENT)
Dept: FAMILY MEDICINE CLINIC | Facility: CLINIC | Age: 86
End: 2021-01-01

## 2021-01-01 DIAGNOSIS — I10 ESSENTIAL HYPERTENSION: ICD-10-CM

## 2021-01-01 DIAGNOSIS — Z23 NEED FOR VACCINATION: ICD-10-CM

## 2021-01-01 DIAGNOSIS — R47.9 SPEECH DISTURBANCE, UNSPECIFIED TYPE: Primary | ICD-10-CM

## 2021-01-01 RX ORDER — LEVOTHYROXINE SODIUM 0.1 MG/1
TABLET ORAL
Qty: 30 TABLET | Refills: 2 | Status: SHIPPED | OUTPATIENT
Start: 2021-01-01

## 2021-01-01 RX ORDER — METOPROLOL SUCCINATE 25 MG/1
25 TABLET, EXTENDED RELEASE ORAL DAILY
Qty: 30 TABLET | Refills: 1 | Status: SHIPPED | OUTPATIENT
Start: 2021-01-01

## 2021-01-01 RX ORDER — LISINOPRIL 20 MG/1
20 TABLET ORAL 2 TIMES DAILY
Qty: 30 TABLET | Refills: 0 | Status: SHIPPED | OUTPATIENT
Start: 2021-01-01

## 2021-01-01 RX ORDER — LISINOPRIL 20 MG/1
TABLET ORAL
Qty: 180 TABLET | Refills: 0 | Status: SHIPPED | OUTPATIENT
Start: 2021-01-01 | End: 2021-01-01

## 2021-01-09 NOTE — TELEPHONE ENCOUNTER
Future appt:    Last Appointment with provider:   12/17/2020 HTN follow up, Recheck in 6 months.   Last appointment at [facility] [place]:  12/17/2020  Cholesterol, Total (mg/dL)   Date Value   12/17/2020 129     HDL Cholesterol (mg/dL)   Date Value   12/17/2020

## 2021-01-15 NOTE — TELEPHONE ENCOUNTER
Called back Esteban Roa at [facility]. She took verbal order for speech therapy. Leticia Adam has approached family with idea of transitioning patient to palliative care, although there is no interest in proceeding at this time.

## 2021-01-15 NOTE — TELEPHONE ENCOUNTER
Parth Montana calling from [facility] and asking for order after today's visit to continue speech therapy once a week for 8 weeks. She also states she has to inform us pts BP was 152/66 and HR was 50. Please advise.

## 2021-01-15 NOTE — TELEPHONE ENCOUNTER
monitor blood pressure daily . Report bp >170/100. Recommend Speech therapy. If worsening consider clinical evaluation for cva.

## 2021-03-16 NOTE — TELEPHONE ENCOUNTER
d/c from home health    reached full potential    /  family wants to initiate pallitive care because she sleeps day and night     FYI           Future Appointments   Date Time Provider Mally Joy   4/15/2021 11:10 AM MD MERLENE Zepeda

## 2021-03-16 NOTE — TELEPHONE ENCOUNTER
What can we do to help Julissa Alcaraz? Ok to discharge. Please call Julissa Alcaraz and reach out to support.

## 2021-03-16 NOTE — TELEPHONE ENCOUNTER
Patient's son states they are well, do not need anything at this time. Intend to bring patient in for physical on 4/15 and should be alright until then.

## 2021-03-16 NOTE — TELEPHONE ENCOUNTER
Spoke with Law Fonseca from home health. Radha recommended palliative care to the family. Patient's family is not ready to pursue palliative care or hospice at this time. Patient is stable, sleeps for much of the day.

## 2021-03-24 NOTE — TELEPHONE ENCOUNTER
Future appt:     Your appointments     Date & Time Appointment Department [facility])    Apr 15, 2021 11:10 AM CDT Medicare Annual Well Visit with Jacque Martínez MD [address] [place] [address] [place], [place] ([place])            [facility]

## 2021-04-07 NOTE — TELEPHONE ENCOUNTER
Patients son states that pt has an appt coming up but states that they most likely won't be able to bring her, patients daughter would like to talk to Dr Leodan Grider personally to exaplain to her the situation.  Would like a call back tomorrow when Dr Leodan Grider comes b

## 2021-04-08 NOTE — TELEPHONE ENCOUNTER
Having more difficulty with transfers, and commands,   Getting very difficult to transfer her anywhere. Doesn't think he can get her to the office. Wondering if there is an option for home visits. Does not have a home nurse coming in .   Does not have

## 2021-04-08 NOTE — TELEPHONE ENCOUNTER
Call placed to [facility], [facility]. Faxed order and required documents.

## 2021-04-09 NOTE — TELEPHONE ENCOUNTER
Future appt:     Your appointments     Date & Time Appointment Department [facility])    Apr 15, 2021 11:10 AM CDT Medicare Annual Well Visit with Natividad Cuevas MD [address] [place] [address]

## 2021-04-23 NOTE — TELEPHONE ENCOUNTER
Spoke with Jamaica Bravo, (Ruths son). Moody Panda woke up this morning with her right eye swollen and red. No discharge that he noticed. He was wondering if there were any drops they could use?  thanks saline lock

## 2021-06-03 ENCOUNTER — PATIENT OUTREACH (OUTPATIENT)
Dept: FAMILY MEDICINE CLINIC | Facility: CLINIC | Age: 86
End: 2021-06-03

## 2023-10-24 NOTE — TELEPHONE ENCOUNTER
Ok to give verbal approval for home PT twice a week for next 8 weeks?     Jamie Garcia, 07/31/18, 11:25 AM chest tightness with onset at 2100, given 324 ASA en route - hx HTN anxiety DMT2

## 2024-09-24 NOTE — TELEPHONE ENCOUNTER
Detail Level: Zone
Son states patient was told she was going to be referred to [facility], son states he called to set up things with them and he was told they have not recieved a referral for patient.  Son requested a call back
Spoke with pt's son to confirm fax was sent on 7/9/19 to [place]. Pt's son requesting to have faxed again. Fax sent with phone confirmation.
Detail Level: Simple
Detail Level: Detailed
Moisturizer Recommendations: Elta md clear
